# Patient Record
Sex: MALE | Race: WHITE | URBAN - METROPOLITAN AREA
[De-identification: names, ages, dates, MRNs, and addresses within clinical notes are randomized per-mention and may not be internally consistent; named-entity substitution may affect disease eponyms.]

---

## 2020-04-24 ENCOUNTER — EMERGENCY (EMERGENCY)
Facility: HOSPITAL | Age: 60
LOS: 0 days | Discharge: HOME | End: 2020-04-24
Payer: MEDICARE

## 2020-04-24 VITALS
SYSTOLIC BLOOD PRESSURE: 119 MMHG | OXYGEN SATURATION: 99 % | DIASTOLIC BLOOD PRESSURE: 77 MMHG | RESPIRATION RATE: 18 BRPM | HEART RATE: 89 BPM | TEMPERATURE: 98 F

## 2020-04-24 DIAGNOSIS — R05 COUGH: ICD-10-CM

## 2020-04-24 DIAGNOSIS — R07.89 OTHER CHEST PAIN: ICD-10-CM

## 2020-04-24 DIAGNOSIS — R07.0 PAIN IN THROAT: ICD-10-CM

## 2020-04-24 DIAGNOSIS — U07.1 COVID-19: ICD-10-CM

## 2020-04-24 PROCEDURE — 99283 EMERGENCY DEPT VISIT LOW MDM: CPT

## 2020-04-24 PROCEDURE — 87635 SARS-COV-2 COVID-19 AMP PRB: CPT

## 2020-04-24 NOTE — ED STATDOCS - PATIENT PORTAL LINK FT
You can access the FollowMyHealth Patient Portal offered by [facility] by registering at the following website: [email or web address]. By joining [facility]’s FollowMyHealth portal, you will also be able to view your health information using other applications (apps) compatible with our system.

## 2020-04-24 NOTE — ED STATDOCS - PHYSICAL EXAMINATION
Constitutional: NAD AAOx3. Nontoxic, well appearing. Speaking full sentences  w/o distress  Eyes: EOMI, pupils equal  Head: Normocephalic atraumatic  Mouth: no airway obstruction  Cardiac: p8m2ile   Resp: Lungs CTAB  GI: Abd s/nt/nd  Neuro: motor and sensory intact

## 2020-04-24 NOTE — ED STATDOCS - OBJECTIVE STATEMENT
Pt presents to ED with sore throat, chest tightness when breathing in, cough   FOR 5   days.   Pt concerned for possible COVID-19.   Pt here for testing.

## 2020-04-24 NOTE — ED STATDOCS - NS ED ROS FT
ROS:   Constitutional- -fever, -chills.    ENT- -rhinorrhea,  sore throat, no congestion.    Cardiac- no chest pain, no palpitations,   Respiratory- +cough, -SOB    Abdomen- No nausea, no vomiting, no diarrhea.    Urinary- no dysuria, no urgency, no frequency.    Skin- No rashes

## 2020-04-25 LAB — SARS-COV-2 RNA SPEC QL NAA+PROBE: SIGNIFICANT CHANGE UP

## 2020-09-08 ENCOUNTER — APPOINTMENT (OUTPATIENT)
Dept: RADIOLOGY | Facility: CLINIC | Age: 60
End: 2020-09-08
Payer: SELF-PAY

## 2020-09-08 ENCOUNTER — OUTPATIENT (OUTPATIENT)
Dept: OUTPATIENT SERVICES | Facility: HOSPITAL | Age: 60
LOS: 1 days | End: 2020-09-08
Payer: COMMERCIAL

## 2020-09-08 ENCOUNTER — OUTPATIENT (OUTPATIENT)
Dept: OUTPATIENT SERVICES | Facility: HOSPITAL | Age: 60
LOS: 1 days | End: 2020-09-08
Payer: SELF-PAY

## 2020-09-08 VITALS
SYSTOLIC BLOOD PRESSURE: 138 MMHG | TEMPERATURE: 98 F | WEIGHT: 192.02 LBS | HEIGHT: 71 IN | OXYGEN SATURATION: 99 % | DIASTOLIC BLOOD PRESSURE: 90 MMHG | HEART RATE: 68 BPM | RESPIRATION RATE: 15 BRPM

## 2020-09-08 DIAGNOSIS — M25.569 PAIN IN UNSPECIFIED KNEE: ICD-10-CM

## 2020-09-08 DIAGNOSIS — Z98.890 OTHER SPECIFIED POSTPROCEDURAL STATES: Chronic | ICD-10-CM

## 2020-09-08 DIAGNOSIS — M20.42 OTHER HAMMER TOE(S) (ACQUIRED), LEFT FOOT: ICD-10-CM

## 2020-09-08 DIAGNOSIS — S83.232A COMPLEX TEAR OF MEDIAL MENISCUS, CURRENT INJURY, LEFT KNEE, INITIAL ENCOUNTER: ICD-10-CM

## 2020-09-08 DIAGNOSIS — Z01.818 ENCOUNTER FOR OTHER PREPROCEDURAL EXAMINATION: ICD-10-CM

## 2020-09-08 LAB
A1C WITH ESTIMATED AVERAGE GLUCOSE RESULT: 7.5 % — HIGH (ref 4–5.6)
ALBUMIN SERPL ELPH-MCNC: 3.8 G/DL — SIGNIFICANT CHANGE UP (ref 3.3–5)
ALP SERPL-CCNC: 53 U/L — SIGNIFICANT CHANGE UP (ref 40–120)
ALT FLD-CCNC: 43 U/L — SIGNIFICANT CHANGE UP (ref 12–78)
ANION GAP SERPL CALC-SCNC: 5 MMOL/L — SIGNIFICANT CHANGE UP (ref 5–17)
AST SERPL-CCNC: 20 U/L — SIGNIFICANT CHANGE UP (ref 15–37)
BILIRUB SERPL-MCNC: 0.4 MG/DL — SIGNIFICANT CHANGE UP (ref 0.2–1.2)
BUN SERPL-MCNC: 17 MG/DL — SIGNIFICANT CHANGE UP (ref 7–23)
CALCIUM SERPL-MCNC: 9.4 MG/DL — SIGNIFICANT CHANGE UP (ref 8.5–10.1)
CHLORIDE SERPL-SCNC: 105 MMOL/L — SIGNIFICANT CHANGE UP (ref 96–108)
CO2 SERPL-SCNC: 27 MMOL/L — SIGNIFICANT CHANGE UP (ref 22–31)
CREAT SERPL-MCNC: 0.8 MG/DL — SIGNIFICANT CHANGE UP (ref 0.5–1.3)
ESTIMATED AVERAGE GLUCOSE: 169 MG/DL — HIGH (ref 68–114)
GLUCOSE SERPL-MCNC: 154 MG/DL — HIGH (ref 70–99)
HCT VFR BLD CALC: 43.2 % — SIGNIFICANT CHANGE UP (ref 39–50)
HGB BLD-MCNC: 14.8 G/DL — SIGNIFICANT CHANGE UP (ref 13–17)
MCHC RBC-ENTMCNC: 29.9 PG — SIGNIFICANT CHANGE UP (ref 27–34)
MCHC RBC-ENTMCNC: 34.3 GM/DL — SIGNIFICANT CHANGE UP (ref 32–36)
MCV RBC AUTO: 87.3 FL — SIGNIFICANT CHANGE UP (ref 80–100)
NRBC # BLD: 0 /100 WBCS — SIGNIFICANT CHANGE UP (ref 0–0)
PLATELET # BLD AUTO: 311 K/UL — SIGNIFICANT CHANGE UP (ref 150–400)
POTASSIUM SERPL-MCNC: 4 MMOL/L — SIGNIFICANT CHANGE UP (ref 3.5–5.3)
POTASSIUM SERPL-SCNC: 4 MMOL/L — SIGNIFICANT CHANGE UP (ref 3.5–5.3)
PROT SERPL-MCNC: 7.7 G/DL — SIGNIFICANT CHANGE UP (ref 6–8.3)
RBC # BLD: 4.95 M/UL — SIGNIFICANT CHANGE UP (ref 4.2–5.8)
RBC # FLD: 12.4 % — SIGNIFICANT CHANGE UP (ref 10.3–14.5)
SODIUM SERPL-SCNC: 137 MMOL/L — SIGNIFICANT CHANGE UP (ref 135–145)
WBC # BLD: 6.33 K/UL — SIGNIFICANT CHANGE UP (ref 3.8–10.5)
WBC # FLD AUTO: 6.33 K/UL — SIGNIFICANT CHANGE UP (ref 3.8–10.5)

## 2020-09-08 PROCEDURE — 85027 COMPLETE CBC AUTOMATED: CPT

## 2020-09-08 PROCEDURE — 93010 ELECTROCARDIOGRAM REPORT: CPT

## 2020-09-08 PROCEDURE — 73620 X-RAY EXAM OF FOOT: CPT

## 2020-09-08 PROCEDURE — 73620 X-RAY EXAM OF FOOT: CPT | Mod: 26,50

## 2020-09-08 PROCEDURE — 36415 COLL VENOUS BLD VENIPUNCTURE: CPT

## 2020-09-08 PROCEDURE — 83036 HEMOGLOBIN GLYCOSYLATED A1C: CPT

## 2020-09-08 PROCEDURE — 80053 COMPREHEN METABOLIC PANEL: CPT

## 2020-09-08 PROCEDURE — 93005 ELECTROCARDIOGRAM TRACING: CPT

## 2020-09-08 PROCEDURE — G0463: CPT

## 2020-09-08 NOTE — H&P PST ADULT - HISTORY OF PRESENT ILLNESS
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

## 2020-09-08 NOTE — H&P PST ADULT - NSANTHOSAYNRD_GEN_A_CORE
No. SALIMA screening performed.  STOP BANG Legend: 0-2 = LOW Risk; 3-4 = INTERMEDIATE Risk; 5-8 = HIGH Risk

## 2020-09-08 NOTE — H&P PST ADULT - ASSESSMENT
59 yo M for left knee arthroscopy  meniscectomy     Medical clearance pending w labs        COVID swab pending     Advised no diab meds DOS   no metformin x 24 hrs 61 yo M for left knee arthroscopy  meniscectomy     Medical clearance pending w labs        COVID swab pending     Advised no diab meds DOS   no metformin x 24 hrs  FS DOS

## 2020-09-10 PROBLEM — M19.90 UNSPECIFIED OSTEOARTHRITIS, UNSPECIFIED SITE: Chronic | Status: ACTIVE | Noted: 2020-09-08

## 2020-09-10 PROBLEM — E11.9 TYPE 2 DIABETES MELLITUS WITHOUT COMPLICATIONS: Chronic | Status: ACTIVE | Noted: 2020-09-08

## 2020-09-10 PROBLEM — M54.9 DORSALGIA, UNSPECIFIED: Chronic | Status: ACTIVE | Noted: 2020-09-08

## 2020-09-13 ENCOUNTER — OUTPATIENT (OUTPATIENT)
Dept: OUTPATIENT SERVICES | Facility: HOSPITAL | Age: 60
LOS: 1 days | End: 2020-09-13
Payer: COMMERCIAL

## 2020-09-13 DIAGNOSIS — Z98.890 OTHER SPECIFIED POSTPROCEDURAL STATES: Chronic | ICD-10-CM

## 2020-09-13 DIAGNOSIS — Z11.59 ENCOUNTER FOR SCREENING FOR OTHER VIRAL DISEASES: ICD-10-CM

## 2020-09-13 LAB — SARS-COV-2 RNA SPEC QL NAA+PROBE: SIGNIFICANT CHANGE UP

## 2020-09-13 PROCEDURE — U0003: CPT

## 2020-09-14 ENCOUNTER — TRANSCRIPTION ENCOUNTER (OUTPATIENT)
Age: 60
End: 2020-09-14

## 2020-09-15 ENCOUNTER — OUTPATIENT (OUTPATIENT)
Dept: OUTPATIENT SERVICES | Facility: HOSPITAL | Age: 60
LOS: 1 days | End: 2020-09-15
Payer: COMMERCIAL

## 2020-09-15 ENCOUNTER — RESULT REVIEW (OUTPATIENT)
Age: 60
End: 2020-09-15

## 2020-09-15 VITALS
DIASTOLIC BLOOD PRESSURE: 85 MMHG | OXYGEN SATURATION: 95 % | HEIGHT: 71 IN | RESPIRATION RATE: 15 BRPM | TEMPERATURE: 98 F | WEIGHT: 192.02 LBS | HEART RATE: 80 BPM | SYSTOLIC BLOOD PRESSURE: 113 MMHG

## 2020-09-15 VITALS
RESPIRATION RATE: 18 BRPM | HEART RATE: 75 BPM | TEMPERATURE: 98 F | DIASTOLIC BLOOD PRESSURE: 76 MMHG | SYSTOLIC BLOOD PRESSURE: 127 MMHG | OXYGEN SATURATION: 98 %

## 2020-09-15 DIAGNOSIS — Z98.890 OTHER SPECIFIED POSTPROCEDURAL STATES: Chronic | ICD-10-CM

## 2020-09-15 DIAGNOSIS — S83.232A COMPLEX TEAR OF MEDIAL MENISCUS, CURRENT INJURY, LEFT KNEE, INITIAL ENCOUNTER: ICD-10-CM

## 2020-09-15 PROCEDURE — 88304 TISSUE EXAM BY PATHOLOGIST: CPT

## 2020-09-15 PROCEDURE — 82962 GLUCOSE BLOOD TEST: CPT

## 2020-09-15 PROCEDURE — 88304 TISSUE EXAM BY PATHOLOGIST: CPT | Mod: 26

## 2020-09-15 PROCEDURE — 29880 ARTHRS KNE SRG MNISECTMY M&L: CPT | Mod: LT

## 2020-09-15 RX ORDER — SODIUM CHLORIDE 9 MG/ML
1000 INJECTION, SOLUTION INTRAVENOUS
Refills: 0 | Status: DISCONTINUED | OUTPATIENT
Start: 2020-09-15 | End: 2020-09-15

## 2020-09-15 RX ORDER — HYDROMORPHONE HYDROCHLORIDE 2 MG/ML
0.5 INJECTION INTRAMUSCULAR; INTRAVENOUS; SUBCUTANEOUS
Refills: 0 | Status: DISCONTINUED | OUTPATIENT
Start: 2020-09-15 | End: 2020-09-15

## 2020-09-15 RX ORDER — MORPHINE SULFATE 50 MG/1
8 CAPSULE, EXTENDED RELEASE ORAL ONCE
Refills: 0 | Status: DISCONTINUED | OUTPATIENT
Start: 2020-09-15 | End: 2020-09-15

## 2020-09-15 RX ORDER — CEFAZOLIN SODIUM 1 G
2000 VIAL (EA) INJECTION ONCE
Refills: 0 | Status: COMPLETED | OUTPATIENT
Start: 2020-09-15 | End: 2020-09-15

## 2020-09-15 RX ORDER — ACETAMINOPHEN 500 MG
1000 TABLET ORAL ONCE
Refills: 0 | Status: DISCONTINUED | OUTPATIENT
Start: 2020-09-15 | End: 2020-09-15

## 2020-09-15 RX ORDER — OXYCODONE AND ACETAMINOPHEN 5; 325 MG/1; MG/1
1 TABLET ORAL
Qty: 20 | Refills: 0
Start: 2020-09-15 | End: 2020-09-21

## 2020-09-15 RX ADMIN — SODIUM CHLORIDE 50 MILLILITER(S): 9 INJECTION, SOLUTION INTRAVENOUS at 13:26

## 2020-09-15 RX ADMIN — SODIUM CHLORIDE 75 MILLILITER(S): 9 INJECTION, SOLUTION INTRAVENOUS at 16:06

## 2020-09-15 NOTE — BRIEF OPERATIVE NOTE - NSICDXBRIEFPROCEDURE_GEN_ALL_CORE_FT
PROCEDURES:  Arthroscopic meniscectomy of left knee 15-Sep-2020 16:44:40  Joann Nguyen  Left knee arthroscopy 15-Sep-2020 16:39:55  Joann Nguyen

## 2020-09-15 NOTE — ASU DISCHARGE PLAN (ADULT/PEDIATRIC) - CARE PROVIDER_API CALL
Matthew Chi  ORTHOPAEDIC SURGERY  [address] [place] [address]  Phone: [phone]  Fax: [phone]  Follow Up Time:

## 2020-09-15 NOTE — ASU DISCHARGE PLAN (ADULT/PEDIATRIC) - CALL YOUR DOCTOR IF YOU HAVE ANY OF THE FOLLOWING:
Bleeding that does not stop/Wound/Surgical Site with redness, or foul smelling discharge or pus/Pain not relieved by Medications/Swelling that gets worse

## 2021-02-22 ENCOUNTER — APPOINTMENT (OUTPATIENT)
Dept: VASCULAR SURGERY | Facility: CLINIC | Age: 61
End: 2021-02-22
Payer: COMMERCIAL

## 2021-02-22 VITALS
HEIGHT: 72 IN | DIASTOLIC BLOOD PRESSURE: 92 MMHG | WEIGHT: 205 LBS | HEART RATE: 80 BPM | TEMPERATURE: 97.4 F | OXYGEN SATURATION: 97 % | SYSTOLIC BLOOD PRESSURE: 157 MMHG | BODY MASS INDEX: 27.77 KG/M2

## 2021-02-22 DIAGNOSIS — I87.2 VENOUS INSUFFICIENCY (CHRONIC) (PERIPHERAL): ICD-10-CM

## 2021-02-22 PROCEDURE — 93970 EXTREMITY STUDY: CPT

## 2021-02-22 PROCEDURE — 99203 OFFICE O/P NEW LOW 30 MIN: CPT

## 2021-02-22 PROCEDURE — 99072 ADDL SUPL MATRL&STAF TM PHE: CPT

## 2021-02-22 NOTE — PHYSICAL EXAM
[JVD] : no jugular venous distention  [Normal Breath Sounds] : Normal breath sounds [Normal Heart Sounds] : normal heart sounds [2+] : left 2+ [Ankle Swelling (On Exam)] : present [Ankle Swelling Bilaterally] : bilaterally  [Varicose Veins Of Lower Extremities] : bilaterally [Ankle Swelling On The Left] : moderate [] : of the left leg [Ankle Swelling On The Right] : mild [Abdomen Masses] : No abdominal masses [Abdomen Tenderness] : ~T ~M No abdominal tenderness [No Rash or Lesion] : No rash or lesion [Alert] : alert [Oriented to Person] : oriented to person [Oriented to Place] : oriented to place [Oriented to Time] : oriented to time [Calm] : calm [de-identified] : Well appearing

## 2021-02-22 NOTE — HISTORY OF PRESENT ILLNESS
[FreeTextEntry1] : 60 year old man presents for evaluation of bilateral lower extremity varicose veins. Symptoms have worsened over the last year. He complains of dull aching with standing or sitting, heaviness in his legs. He also has ankle swelling that worsens through the day. Symptoms are worse in his left leg.\par No history of DVT.\par he denies claudication, rest pain or leg ulcers\par \par \par

## 2021-02-22 NOTE — ASSESSMENT
[FreeTextEntry1] : 60 year old man with bilateral lower extremity symptomatic varicose veins\par Venous duplex reveals pathologic reflux> 5 seconds in his left great saphenous vein. He has few incompetent superficial veins in his right leg.\par He has failed conservative management strategies including compression stockings and exercise.\par I have discussed the option of GSV RF ablation. We have discussed the risks and benefits of the planned procedure and he will like to proceed.\par Will schedule for left GSV ablation

## 2021-05-19 ENCOUNTER — RESULT CHARGE (OUTPATIENT)
Age: 61
End: 2021-05-19

## 2021-05-19 ENCOUNTER — LABORATORY RESULT (OUTPATIENT)
Age: 61
End: 2021-05-19

## 2021-05-19 ENCOUNTER — APPOINTMENT (OUTPATIENT)
Dept: ENDOCRINOLOGY | Facility: CLINIC | Age: 61
End: 2021-05-19
Payer: COMMERCIAL

## 2021-05-19 VITALS
SYSTOLIC BLOOD PRESSURE: 130 MMHG | BODY MASS INDEX: 27.09 KG/M2 | HEART RATE: 84 BPM | HEIGHT: 72 IN | WEIGHT: 200 LBS | DIASTOLIC BLOOD PRESSURE: 87 MMHG | OXYGEN SATURATION: 98 %

## 2021-05-19 DIAGNOSIS — E11.43 TYPE 2 DIABETES MELLITUS WITH DIABETIC AUTONOMIC (POLY)NEUROPATHY: ICD-10-CM

## 2021-05-19 DIAGNOSIS — E66.3 OVERWEIGHT: ICD-10-CM

## 2021-05-19 PROCEDURE — 36415 COLL VENOUS BLD VENIPUNCTURE: CPT

## 2021-05-19 PROCEDURE — 83036 HEMOGLOBIN GLYCOSYLATED A1C: CPT | Mod: QW

## 2021-05-19 PROCEDURE — 99072 ADDL SUPL MATRL&STAF TM PHE: CPT

## 2021-05-19 PROCEDURE — 99205 OFFICE O/P NEW HI 60 MIN: CPT | Mod: 25

## 2021-05-19 PROCEDURE — 82962 GLUCOSE BLOOD TEST: CPT | Mod: NC

## 2021-05-19 RX ORDER — BLOOD-GLUCOSE METER
W/DEVICE EACH MISCELLANEOUS
Qty: 1 | Refills: 0 | Status: ACTIVE | COMMUNITY
Start: 2021-05-19 | End: 1900-01-01

## 2021-05-19 RX ORDER — LANCETS 33 GAUGE
EACH MISCELLANEOUS
Qty: 3 | Refills: 3 | Status: ACTIVE | COMMUNITY
Start: 2021-05-19 | End: 1900-01-01

## 2021-05-24 ENCOUNTER — NON-APPOINTMENT (OUTPATIENT)
Age: 61
End: 2021-05-24

## 2021-05-24 DIAGNOSIS — B35.1 TINEA UNGUIUM: ICD-10-CM

## 2021-05-24 DIAGNOSIS — Z72.89 OTHER PROBLEMS RELATED TO LIFESTYLE: ICD-10-CM

## 2021-05-24 DIAGNOSIS — Z78.9 OTHER SPECIFIED HEALTH STATUS: ICD-10-CM

## 2021-05-24 DIAGNOSIS — Z83.3 FAMILY HISTORY OF DIABETES MELLITUS: ICD-10-CM

## 2021-05-24 DIAGNOSIS — Z86.79 PERSONAL HISTORY OF OTHER DISEASES OF THE CIRCULATORY SYSTEM: ICD-10-CM

## 2021-05-24 RX ORDER — BLOOD SUGAR DIAGNOSTIC
STRIP MISCELLANEOUS
Qty: 3 | Refills: 5 | Status: ACTIVE | COMMUNITY
Start: 2021-05-19 | End: 1900-01-01

## 2021-05-24 RX ORDER — CAPSAICIN 0.1 G/100G
0.1 CREAM TOPICAL
Qty: 3 | Refills: 3 | Status: ACTIVE | COMMUNITY
Start: 2021-05-19 | End: 1900-01-01

## 2021-05-26 ENCOUNTER — APPOINTMENT (OUTPATIENT)
Dept: FAMILY MEDICINE | Facility: CLINIC | Age: 61
End: 2021-05-26

## 2021-05-27 ENCOUNTER — LABORATORY RESULT (OUTPATIENT)
Age: 61
End: 2021-05-27

## 2021-06-02 LAB
25(OH)D3 SERPL-MCNC: 38.3 NG/ML
ALBUMIN SERPL ELPH-MCNC: 4.8 G/DL
ALP BLD-CCNC: 59 U/L
ALT SERPL-CCNC: 106 U/L
ANION GAP SERPL CALC-SCNC: 14 MMOL/L
AST SERPL-CCNC: 46 U/L
BILIRUB SERPL-MCNC: 0.6 MG/DL
BUN SERPL-MCNC: 22 MG/DL
C PEPTIDE SERPL-MCNC: 4 NG/ML
CALCIUM SERPL-MCNC: 9.6 MG/DL
CHLORIDE SERPL-SCNC: 101 MMOL/L
CHOLEST SERPL-MCNC: 172 MG/DL
CO2 SERPL-SCNC: 21 MMOL/L
CREAT SERPL-MCNC: 0.81 MG/DL
CREAT SPEC-SCNC: 212 MG/DL
FRUCTOSAMINE SERPL-MCNC: 360 UMOL/L
GLUCOSE SERPL-MCNC: 194 MG/DL
HDLC SERPL-MCNC: 45 MG/DL
LDLC SERPL CALC-MCNC: 98 MG/DL
MICROALBUMIN 24H UR DL<=1MG/L-MCNC: 2.5 MG/DL
MICROALBUMIN/CREAT 24H UR-RTO: 12 MG/G
NONHDLC SERPL-MCNC: 127 MG/DL
POTASSIUM SERPL-SCNC: 4.5 MMOL/L
PROT SERPL-MCNC: 7.7 G/DL
SODIUM SERPL-SCNC: 136 MMOL/L
TRIGL SERPL-MCNC: 142 MG/DL
VIT B12 SERPL-MCNC: 1234 PG/ML

## 2021-06-03 ENCOUNTER — NON-APPOINTMENT (OUTPATIENT)
Age: 61
End: 2021-06-03

## 2021-06-09 ENCOUNTER — NON-APPOINTMENT (OUTPATIENT)
Age: 61
End: 2021-06-09

## 2021-06-10 ENCOUNTER — APPOINTMENT (OUTPATIENT)
Dept: FAMILY MEDICINE | Facility: CLINIC | Age: 61
End: 2021-06-10
Payer: COMMERCIAL

## 2021-06-10 ENCOUNTER — NON-APPOINTMENT (OUTPATIENT)
Age: 61
End: 2021-06-10

## 2021-06-10 VITALS
BODY MASS INDEX: 27.77 KG/M2 | HEIGHT: 72 IN | DIASTOLIC BLOOD PRESSURE: 92 MMHG | WEIGHT: 205 LBS | HEART RATE: 78 BPM | TEMPERATURE: 96.7 F | OXYGEN SATURATION: 98 % | SYSTOLIC BLOOD PRESSURE: 138 MMHG

## 2021-06-10 VITALS
DIASTOLIC BLOOD PRESSURE: 92 MMHG | HEIGHT: 72 IN | BODY MASS INDEX: 27.77 KG/M2 | WEIGHT: 205 LBS | TEMPERATURE: 96.7 F | HEART RATE: 78 BPM | OXYGEN SATURATION: 98 % | SYSTOLIC BLOOD PRESSURE: 132 MMHG

## 2021-06-10 DIAGNOSIS — G47.00 INSOMNIA, UNSPECIFIED: ICD-10-CM

## 2021-06-10 DIAGNOSIS — G62.9 POLYNEUROPATHY, UNSPECIFIED: ICD-10-CM

## 2021-06-10 DIAGNOSIS — M25.512 PAIN IN LEFT SHOULDER: ICD-10-CM

## 2021-06-10 PROCEDURE — 99214 OFFICE O/P EST MOD 30 MIN: CPT

## 2021-06-10 PROCEDURE — 99072 ADDL SUPL MATRL&STAF TM PHE: CPT

## 2021-06-10 NOTE — REVIEW OF SYSTEMS
[Negative] : Heme/Lymph [Joint Pain] : joint pain [Joint Stiffness] : joint stiffness [Muscle Pain] : muscle pain [Back Pain] : back pain [de-identified] : numbness and tingling of right 4th and 5th digits

## 2021-06-10 NOTE — PLAN
[FreeTextEntry1] : \par I have advised followup with his Ortho re ongoing shoulder and knee pains\par I recommend that patient change sleeping position ,and use salonpas , voltaren gel, Arthritis Tylenol to aid in pain relief\par Trazodone prescribed at pts request today

## 2021-06-10 NOTE — HISTORY OF PRESENT ILLNESS
[FreeTextEntry8] : . \par He has had  shoulder and knee pain and currently under the care of an Orthopedist ,he was recommended to undergo left shoulder surgery.He states that due to his pain ,he has difficulty sleeping and recently has been favoring his right shoulder , and since that time he has intermittent tingling of his 4th and 5th digits during the night \par He is requesting something for sleep ,he used trazodone in past and is requesting refill for this today

## 2021-07-09 ENCOUNTER — APPOINTMENT (OUTPATIENT)
Dept: FAMILY MEDICINE | Facility: CLINIC | Age: 61
End: 2021-07-09

## 2021-07-09 DIAGNOSIS — R79.89 OTHER SPECIFIED ABNORMAL FINDINGS OF BLOOD CHEMISTRY: ICD-10-CM

## 2021-07-09 DIAGNOSIS — E78.5 HYPERLIPIDEMIA, UNSPECIFIED: ICD-10-CM

## 2021-07-23 ENCOUNTER — APPOINTMENT (OUTPATIENT)
Dept: FAMILY MEDICINE | Facility: CLINIC | Age: 61
End: 2021-07-23
Payer: COMMERCIAL

## 2021-07-23 VITALS
OXYGEN SATURATION: 97 % | HEIGHT: 72 IN | SYSTOLIC BLOOD PRESSURE: 128 MMHG | HEART RATE: 85 BPM | WEIGHT: 200 LBS | TEMPERATURE: 97 F | BODY MASS INDEX: 27.09 KG/M2 | DIASTOLIC BLOOD PRESSURE: 78 MMHG

## 2021-07-23 DIAGNOSIS — D17.9 BENIGN LIPOMATOUS NEOPLASM, UNSPECIFIED: ICD-10-CM

## 2021-07-23 PROCEDURE — 99072 ADDL SUPL MATRL&STAF TM PHE: CPT

## 2021-07-23 PROCEDURE — 99213 OFFICE O/P EST LOW 20 MIN: CPT

## 2021-07-23 NOTE — PLAN
[FreeTextEntry1] : I have advised patient to consult with General Surgeon for evaluation and possible excision \par Referral list provided

## 2021-07-23 NOTE — PHYSICAL EXAM
[No Acute Distress] : no acute distress [Well Nourished] : well nourished [Well Developed] : well developed [Well-Appearing] : well-appearing [Normal Sclera/Conjunctiva] : normal sclera/conjunctiva [PERRL] : pupils equal round and reactive to light [EOMI] : extraocular movements intact [Normal Outer Ear/Nose] : the outer ears and nose were normal in appearance [Normal Oropharynx] : the oropharynx was normal [No JVD] : no jugular venous distention [No Lymphadenopathy] : no lymphadenopathy [Supple] : supple [Thyroid Normal, No Nodules] : the thyroid was normal and there were no nodules present [No Respiratory Distress] : no respiratory distress  [No Accessory Muscle Use] : no accessory muscle use [Clear to Auscultation] : lungs were clear to auscultation bilaterally [Normal Rate] : normal rate  [Regular Rhythm] : with a regular rhythm [Normal S1, S2] : normal S1 and S2 [No Murmur] : no murmur heard [No Carotid Bruits] : no carotid bruits [No Abdominal Bruit] : a ~M bruit was not heard ~T in the abdomen [No Varicosities] : no varicosities [Pedal Pulses Present] : the pedal pulses are present [No Edema] : there was no peripheral edema [No Palpable Aorta] : no palpable aorta [No Extremity Clubbing/Cyanosis] : no extremity clubbing/cyanosis [Soft] : abdomen soft [Non Tender] : non-tender [Non-distended] : non-distended [No Masses] : no abdominal mass palpated [No HSM] : no HSM [Normal Bowel Sounds] : normal bowel sounds [Normal Posterior Cervical Nodes] : no posterior cervical lymphadenopathy [Normal Anterior Cervical Nodes] : no anterior cervical lymphadenopathy [No CVA Tenderness] : no CVA  tenderness [No Spinal Tenderness] : no spinal tenderness [No Joint Swelling] : no joint swelling [Grossly Normal Strength/Tone] : grossly normal strength/tone [No Rash] : no rash [Coordination Grossly Intact] : coordination grossly intact [No Focal Deficits] : no focal deficits [Normal Gait] : normal gait [Deep Tendon Reflexes (DTR)] : deep tendon reflexes were 2+ and symmetric [Normal Affect] : the affect was normal [Normal Insight/Judgement] : insight and judgment were intact [de-identified] : several lipomas of upper back

## 2021-07-23 NOTE — HISTORY OF PRESENT ILLNESS
[FreeTextEntry8] : Patient is complaining of nodules on back x many years. He has never had it evaluated and denies any discomfort\par He states the nodules are stable and not enlarging

## 2021-07-26 ENCOUNTER — APPOINTMENT (OUTPATIENT)
Dept: UROLOGY | Facility: CLINIC | Age: 61
End: 2021-07-26
Payer: COMMERCIAL

## 2021-07-26 VITALS
RESPIRATION RATE: 16 BRPM | DIASTOLIC BLOOD PRESSURE: 89 MMHG | HEIGHT: 72 IN | BODY MASS INDEX: 27.63 KG/M2 | HEART RATE: 54 BPM | TEMPERATURE: 98 F | SYSTOLIC BLOOD PRESSURE: 134 MMHG | WEIGHT: 204 LBS

## 2021-07-26 DIAGNOSIS — N52.9 MALE ERECTILE DYSFUNCTION, UNSPECIFIED: ICD-10-CM

## 2021-07-26 PROCEDURE — 99204 OFFICE O/P NEW MOD 45 MIN: CPT

## 2021-07-26 PROCEDURE — 99072 ADDL SUPL MATRL&STAF TM PHE: CPT

## 2021-07-26 RX ORDER — SILDENAFIL 50 MG/1
50 TABLET ORAL
Qty: 30 | Refills: 3 | Status: ACTIVE | COMMUNITY
Start: 2021-07-26 | End: 1900-01-01

## 2021-07-26 RX ORDER — SILDENAFIL 50 MG/1
50 TABLET ORAL
Qty: 5 | Refills: 0 | Status: DISCONTINUED | COMMUNITY
Start: 2021-06-10 | End: 2021-07-26

## 2021-07-26 RX ORDER — TADALAFIL 5 MG/1
5 TABLET ORAL
Qty: 10 | Refills: 0 | Status: DISCONTINUED | COMMUNITY
Start: 2021-05-19 | End: 2021-07-26

## 2021-07-26 NOTE — PHYSICAL EXAM
[General Appearance - Well Developed] : well developed [General Appearance - Well Nourished] : well nourished [Normal Appearance] : normal appearance [Well Groomed] : well groomed [General Appearance - In No Acute Distress] : no acute distress [Edema] : no peripheral edema [Respiration, Rhythm And Depth] : normal respiratory rhythm and effort [Exaggerated Use Of Accessory Muscles For Inspiration] : no accessory muscle use [Abdomen Soft] : soft [Abdomen Tenderness] : non-tender [Costovertebral Angle Tenderness] : no ~M costovertebral angle tenderness [Urethral Meatus] : meatus normal [Urinary Bladder Findings] : the bladder was normal on palpation [Scrotum] : the scrotum was normal [Rectal Exam - Seminal Vesicles] : the seminal vesicles were normal [Epididymis] : the epididymides were normal [Testes Tenderness] : no tenderness of the testes [Testes Mass (___cm)] : there were no testicular masses [Normal Station and Gait] : the gait and station were normal for the patient's age [] : no rash [Oriented To Time, Place, And Person] : oriented to person, place, and time [Affect] : the affect was normal [Mood] : the mood was normal [Not Anxious] : not anxious [Inguinal Lymph Nodes Enlarged Bilaterally] : inguinal

## 2021-07-26 NOTE — HISTORY OF PRESENT ILLNESS
[FreeTextEntry1] : 59 yo male for initial consultation regarding erectile dysfunction\par PMH: DM, arthritis\par PSH: knee arthroscopy\par never a smoker\par NKDA\par \par no previous urologic history\par has been complaining of ED for 4 years\par does not have erections at all, no morning erections also, , libido is intact\par \par was prescribed some cialis, was not instructed on how to use it.\par tried it twice with no improvement\par \par - testosterone normal\par - HBA1C elevated to 8 (05/2021)\par - TSH normal\par - liver enzymes slightly elevated with \par \par \par explained the importance of diabetes control\par needs to consult with his PCP regarding his liver enzymes, and if he needs further testing\par also recommended consulting with cardiology regarding any need for testing due to his ED, that can potentially be an early manifestation of small blood vessels disease\par explained the mechanism of action of pde5 inhibitors, timing, and the need for sexual stimulus. also explained the need to persist and not get discouraged early\par the patient still has libido and sensation. discussed alternative treatment options if oral medication fail.\par \par plan:\par - start sildenafil treatment, discussed instructions again, can take up to 100 mg\par - consult with PCP and cardiology\par - next visit in 1 month, if not improvement will consider consulting a specialist\par - will follow up on PSA with primary care (does not remember result, but know it was tested) [Erectile Dysfunction] : Erectile Dysfunction [None] : None

## 2021-07-29 ENCOUNTER — RX RENEWAL (OUTPATIENT)
Age: 61
End: 2021-07-29

## 2021-07-29 RX ORDER — EMPAGLIFLOZIN 10 MG/1
10 TABLET, FILM COATED ORAL DAILY
Qty: 90 | Refills: 0 | Status: ACTIVE | COMMUNITY
Start: 2021-05-19 | End: 1900-01-01

## 2021-08-16 ENCOUNTER — APPOINTMENT (OUTPATIENT)
Dept: ENDOCRINOLOGY | Facility: CLINIC | Age: 61
End: 2021-08-16

## 2021-08-17 ENCOUNTER — RESULT REVIEW (OUTPATIENT)
Age: 61
End: 2021-08-17

## 2021-11-22 ENCOUNTER — NON-APPOINTMENT (OUTPATIENT)
Age: 61
End: 2021-11-22

## 2022-01-04 ENCOUNTER — RX RENEWAL (OUTPATIENT)
Age: 62
End: 2022-01-04

## 2022-01-05 ENCOUNTER — EMERGENCY (EMERGENCY)
Facility: HOSPITAL | Age: 62
LOS: 0 days | Discharge: HOME | End: 2022-01-05
Attending: EMERGENCY MEDICINE
Payer: COMMERCIAL

## 2022-01-05 VITALS
TEMPERATURE: 98 F | DIASTOLIC BLOOD PRESSURE: 92 MMHG | SYSTOLIC BLOOD PRESSURE: 112 MMHG | RESPIRATION RATE: 16 BRPM | OXYGEN SATURATION: 98 % | HEART RATE: 88 BPM

## 2022-01-05 VITALS — HEIGHT: 71 IN | WEIGHT: 195.11 LBS

## 2022-01-05 DIAGNOSIS — Z98.890 OTHER SPECIFIED POSTPROCEDURAL STATES: Chronic | ICD-10-CM

## 2022-01-05 DIAGNOSIS — S82.62XA DISPLACED FRACTURE OF LATERAL MALLEOLUS OF LEFT FIBULA, INITIAL ENCOUNTER FOR CLOSED FRACTURE: ICD-10-CM

## 2022-01-05 DIAGNOSIS — S51.012A LACERATION WITHOUT FOREIGN BODY OF LEFT ELBOW, INITIAL ENCOUNTER: ICD-10-CM

## 2022-01-05 DIAGNOSIS — M25.572 PAIN IN LEFT ANKLE AND JOINTS OF LEFT FOOT: ICD-10-CM
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
## 2022-01-05 PROCEDURE — 99284 EMERGENCY DEPT VISIT MOD MDM: CPT | Mod: 25

## 2022-01-05 PROCEDURE — 12001 RPR S/N/AX/GEN/TRNK 2.5CM/<: CPT

## 2022-01-05 PROCEDURE — 73562 X-RAY EXAM OF KNEE 3: CPT | Mod: 26,LT

## 2022-01-05 PROCEDURE — 73562 X-RAY EXAM OF KNEE 3: CPT | Mod: LT

## 2022-01-05 PROCEDURE — 73590 X-RAY EXAM OF LOWER LEG: CPT | Mod: LT

## 2022-01-05 PROCEDURE — 27786 TREATMENT OF ANKLE FRACTURE: CPT | Mod: LT

## 2022-01-05 PROCEDURE — 73610 X-RAY EXAM OF ANKLE: CPT | Mod: LT

## 2022-01-05 PROCEDURE — 73610 X-RAY EXAM OF ANKLE: CPT | Mod: 26,LT,76

## 2022-01-05 PROCEDURE — 12001 RPR S/N/AX/GEN/TRNK 2.5CM/<: CPT | Mod: XU

## 2022-01-05 PROCEDURE — 73590 X-RAY EXAM OF LOWER LEG: CPT | Mod: 26,LT

## 2022-01-05 RX ORDER — ACETAMINOPHEN 500 MG
1000 TABLET ORAL ONCE
Refills: 0 | Status: COMPLETED | OUTPATIENT
Start: 2022-01-05 | End: 2022-01-05

## 2022-01-05 RX ORDER — OXYCODONE HYDROCHLORIDE 5 MG/1
1 TABLET ORAL
Qty: 16 | Refills: 0
Start: 2022-01-05 | End: 2022-01-08

## 2022-01-05 RX ADMIN — Medication 1000 MILLIGRAM(S): at 15:34

## 2022-01-05 NOTE — ED STATDOCS - NS ED ROS FT
Constitutional: No fever or chills  Eyes: No visual changes  HEENT: No throat pain  CV: No chest pain  Resp: No SOB no cough  GI: No abd pain, nausea or vomiting  : No dysuria  MSK: + left ankle pain  Skin: + lac  Neuro: No headache

## 2022-01-05 NOTE — ED STATDOCS - NSFOLLOWUPINSTRUCTIONS_ED_ALL_ED_FT
Ankle Dislocation    WHAT YOU NEED TO KNOW:    An ankle dislocation happens when the bones in your ankle joint move out of place. You may also have an ankle fracture (break in the bone). An ankle dislocation and fracture may need surgery.    Dislocated Ankle         DISCHARGE INSTRUCTIONS:    Seek care immediately if:   •The skin around your ankle begins to feel hot, tight, or is shiny or pale.      •Your cast or splint feels too tight.      •Your ankle, foot, or toes feel numb.      Call your doctor or bone specialist if:   •You have trouble walking, or more swelling, pain, or stiffness in your ankle, even after you take your medicine.      •You have questions or concerns about your condition or care.      Medicines: You may need any of the following:   •Prescription pain medicine may be given. Ask your healthcare provider how to take this medicine safely. Some prescription pain medicines contain acetaminophen. Do not take other medicines that contain acetaminophen without talking to your healthcare provider. Too much acetaminophen may cause liver damage. Prescription pain medicine may cause constipation. Ask your healthcare provider how to prevent or treat constipation.       •Acetaminophen decreases pain and fever. It is available without a doctor's order. Ask how much to take and how often to take it. Follow directions. Read the labels of all other medicines you are using to see if they also contain acetaminophen, or ask your doctor or pharmacist. Acetaminophen can cause liver damage if not taken correctly. Do not use more than 4 grams (4,000 milligrams) total of acetaminophen in one day.       •NSAIDs, such as ibuprofen, help decrease swelling, pain, and fever. This medicine is available with or without a doctor's order. NSAIDs can cause stomach bleeding or kidney problems in certain people. If you take blood thinner medicine, always ask if NSAIDs are safe for you. Always read the medicine label and follow directions. Do not give these medicines to children under 6 months of age without direction from your child's healthcare provider.      •Take your medicine as directed. Contact your healthcare provider if you think your medicine is not helping or if you have side effects. Tell him or her if you are allergic to any medicine. Keep a list of the medicines, vitamins, and herbs you take. Include the amounts, and when and why you take them. Bring the list or the pill bottles to follow-up visits. Carry your medicine list with you in case of an emergency.      Rest your ankle: You will need to rest your ankle for 6 weeks after your injury. Do not put pressure on your ankle for long periods of time. This will help keep your ankle safe from more damage, and help it heal faster. Ask your healthcare provider when you may return to your normal daily activities. Movement and activity are helpful for healing. After 6 weeks, practice walking as directed.    Apply ice to your ankle: Apply ice for 15 to 20 minutes every hour or as directed. Use an ice pack, or put crushed ice in a plastic bag. Cover it with a towel before you apply it to your ankle. Ice helps prevent tissue damage and decreases swelling and pain.    Compress your ankle: You may need to use an elastic bandage to compress (put pressure on) your ankle to help decrease swelling. Compression also helps support your ankle and allows it to heal. Wear your ankle wrap for as long as directed. You may also need a brace, short leg cast, or splint to help protect your ankle. A splint is a type of brace that keeps your ankle stable. Ask how to care for your brace, cast, or splint.    How to Wrap an Elastic Bandage         Elevate your ankle: Elevate your ankle above the level of your heart as often as you can. This will help decrease swelling and pain. Prop your ankle on pillows or blankets to keep it elevated comfortably.    Elevate Leg         Use crutches, if directed: You may need crutches to help you walk while your ankle heals. Crutches help you keep your weight off your ankle, and help prevent more ankle damage.    Walking with Crutches         Go to physical therapy, if directed: A physical therapist can teach you exercises to increase the range of motion in your ankle. Exercises make your ankle stronger, increase balance, and decrease pain. You may be told to continue the exercises after physical therapy ends to help prevent another dislocation.    Follow up with your doctor or bone specialist as directed: Write down your questions so you remember to ask them during your visits.    Sutured Wound Care  ImageSutures are stitches that can be used to close wounds. Taking care of your wound properly can help prevent pain and infection. It can also help your wound to heal more quickly.    How is this treated?  Wound Care     Keep the wound clean and dry.  If you were given a bandage (dressing), change it at least one time per day or as told by your doctor. You should also change it if it gets wet or dirty.  Keep the wound completely dry for the first 24 hours or as told by your doctor. After that time, you may shower or bathe. However, make sure that the wound is not soaked in water until the sutures have been removed.  Clean the wound one time each day or as told by your doctor.    Wash the wound with soap and water.  Rinse the wound with water to remove all soap.  Pat the wound dry with a clean towel. Do not rub the wound.    After cleaning the wound, put a thin layer of antibiotic ointment on it as told your doctor. This ointment:    Helps to prevent infection.  Keeps the bandage from sticking to the wound.    Have the sutures removed as told by your doctor.  General Instructions     Take or apply medicines only as told by your doctor.  To help prevent scarring, make sure to cover your wound with sunscreen whenever you are outside after the sutures are removed and the wound is healed. Make sure to wear a sunscreen of at least 30 SPF.  If you were prescribed an antibiotic medicine or ointment, finish all of it even if you start to feel better.  Do not scratch or pick at the wound.  Keep all follow-up visits as told by your doctor. This is important.  Check your wound every day for signs of infection. Watch for:    Redness, swelling, or pain.  Fluid, blood, or pus.    Raise (elevate) the injured area above the level of your heart while you are sitting or lying down, if possible.  Avoid stretching your wound.  Drink enough fluids to keep your pee (urine) clear or pale yellow.  Contact a doctor if:  You were given a tetanus shot and you have any of these where the needle went in:    Swelling.  Very bad pain.  Redness.  Bleeding.    You have a fever.  A wound that was closed breaks open.  You notice a bad smell coming from the wound.  You notice something coming out of the wound, such as wood or glass.  Medicine does not help your pain.  You have any of these at the site of the wound.    More redness.  More swelling.  More pain.    You have any of these coming from the wound.    Fluid.  Blood.  Pus.    You notice a change in the color of your skin near the wound.  You need to change the bandage often due to fluid, blood, or pus coming from the wound.  You have a new rash.  You have numbness around the wound.  Get help right away if:  You have very bad swelling around the wound.  Your pain suddenly gets worse and is very bad.  You have painful lumps near the wound or on skin that is anywhere on your body.  You have a red streak going away from the wound.  The wound is on your hand or foot and you cannot move a finger or toe like normal.  The wound is on your hand or foot and you notice that your fingers or toes look pale or bluish.  This information is not intended to replace advice given to you by your health care provider. Make sure you discuss any questions you have with your health care provider. Ankle Fracture    The ankle joint is made up of the lower (distal) sections of the lower leg bones, called the tibia and fibula, along with a bone in the foot called the talus. An ankle fracture is a break in one, two, or all three of these sections of bone. There are two general types of ankle fractures:  •Stable fracture. This happens when one of the bones is broken, but the bones of the ankle joint stay in their normal positions.      •Unstable fracture. This type can include more than one broken bone. It can also happen if the outer bone is broken and the strong tissues that connect bones to each other (ligaments) are also injured at the inner ankle. This type of fracture allows the talus to move out of its normal position.        What are the causes?  This condition may be caused by:  •A hard, direct hit to the ankle.      •Quickly and severely twisting your ankle, often while your foot is planted and the rest of your body is moving.      •Trauma, such as from a car crash or a fall from a height.        What increases the risk?  The following factors may make you more likely to develop this condition:  •Being overweight.      •Participating in sports that involve quick direction changes, as in soccer.      •Doing high-impact sports such as gymnastics or football.        What are the signs or symptoms?  Symptoms of this condition include:  •A tender and swollen ankle.      •Bruising around your injured ankle.      •Pain when moving or pressing on your ankle.      •Trouble walking or using your ankle to support your body weight (putting weight on your ankle).      •Pain that gets worse when you move your foot or ankle or when you stand.      •Pain that gets better with rest.        How is this diagnosed?    An ankle fracture is usually diagnosed with a physical exam and X-rays. You may also have a CT scan or an MRI.      How is this treated?    Treatment for this condition depends on the type of ankle fracture you have. Stable fractures are treated with a cast, boot, or splint to hold the ankle still and crutches to avoid putting weight on the ankle until the fracture heals. Unstable fractures require surgery to ensure that the bones heal properly. After surgery, you will have a splint. After your incision has healed, your surgeon may give you a cast or a boot. You will not be able to put weight on your injured side for several weeks.    After your ankle has healed, you will do physical therapy exercises to improve movement and strength in your ankle.      Follow these instructions at home:    If you have a boot or splint:     •Wear the boot or splint as told by your health care provider. Remove it only as told by your health care provider.      •Loosen it if your toes tingle, become numb, or turn cold and blue.      •Keep it clean and dry.      If you have a cast:     • Do not put pressure on any part of the cast until it is fully hardened. This may take several hours.      • Do not stick anything inside the cast to scratch your skin. Doing that increases your risk of infection.      •Check the skin around the cast every day. Tell your health care provider about any concerns.      •You may put lotion on dry skin around the edges of the cast. Do not put lotion on the skin underneath the cast.      •Keep it clean and dry.      Bathing     • Do not take baths, swim, or use a hot tub until your health care provider approves. Ask your health care provider if you may take showers. You may only be allowed to take sponge baths.    •If the cast, boot, or splint is not waterproof:  •Do not let it get wet.      •Cover it with a watertight covering when you take a bath or shower.          Managing pain, stiffness, and swelling    •If directed, put ice on the injured area. To do this:  •If you have a removable splint or boot, remove it as told by your health care provider.      •Put ice in a plastic bag.      •Place a towel between your skin and the bag or between your cast and the bag.      •Leave the ice on for 20 minutes, 2–3 times a day.      •Remove the ice if your skin turns bright red. This is very important. If you cannot feel pain, heat, or cold, you have a greater risk of damage to the area.        •Move your toes often to reduce stiffness and swelling.      •Raise (elevate) the injured area above the level of your heart while you are sitting or lying down.      Activity     •Do exercises as told by your health care provider.      •Return to your normal activities as told by your health care provider. Ask your health care provider what activities are safe for you.      • Do not use the injured limb to support your body weight until your health care provider says that you can. Use crutches as told by your health care provider.      General instructions     •Take over-the-counter and prescription medicines only as told by your health care provider.      •Ask your health care provider when it is safe to drive if you have a cast, boot, or splint on your ankle.      • Do not use any products that contain nicotine or tobacco, such as cigarettes, e-cigarettes, and chewing tobacco. These can delay bone healing. If you need help quitting, ask your health care provider.      •Keep all follow-up visits. This is important.        Contact a health care provider if:    •You have pain or swelling that gets worse or does not get better with rest or medicine.      •Your cast gets damaged.        Get help right away if:    •You have severe pain that lasts.      •You develop new pain or swelling.      •Your skin or toenails below the injury turn blue or gray, feel cold, become numb, or are less sensitive to the touch.        Summary    •An ankle fracture can be stable or unstable. This is determined after a physical exam and imaging studies such as X-rays, a CT scan, or an MRI.      •Stable fractures are treated with a cast, boot, or splint to hold the ankle still until the fracture heals. Unstable fractures require surgery to ensure that the bones heal properly.      •You will not be able to put weight on your injured side for several weeks.      •Medicines, icing, and raising (elevating) your injured ankle when you are sitting or lying down may help with pain relief. Follow instructions as told by your health care provider.      This information is not intended to replace advice given to you by your health care provider. Make sure you discuss any questions you have with your health care provider.      Sutured Wound Care  ImageSutures are stitches that can be used to close wounds. Taking care of your wound properly can help prevent pain and infection. It can also help your wound to heal more quickly.    How is this treated?  Wound Care     Keep the wound clean and dry.  If you were given a bandage (dressing), change it at least one time per day or as told by your doctor. You should also change it if it gets wet or dirty.  Keep the wound completely dry for the first 24 hours or as told by your doctor. After that time, you may shower or bathe. However, make sure that the wound is not soaked in water until the sutures have been removed.  Clean the wound one time each day or as told by your doctor.    Wash the wound with soap and water.  Rinse the wound with water to remove all soap.  Pat the wound dry with a clean towel. Do not rub the wound.    After cleaning the wound, put a thin layer of antibiotic ointment on it as told your doctor. This ointment:    Helps to prevent infection.  Keeps the bandage from sticking to the wound.    Have the sutures removed as told by your doctor.  General Instructions     Take or apply medicines only as told by your doctor.  To help prevent scarring, make sure to cover your wound with sunscreen whenever you are outside after the sutures are removed and the wound is healed. Make sure to wear a sunscreen of at least 30 SPF.  If you were prescribed an antibiotic medicine or ointment, finish all of it even if you start to feel better.  Do not scratch or pick at the wound.  Keep all follow-up visits as told by your doctor. This is important.  Check your wound every day for signs of infection. Watch for:    Redness, swelling, or pain.  Fluid, blood, or pus.    Raise (elevate) the injured area above the level of your heart while you are sitting or lying down, if possible.  Avoid stretching your wound.  Drink enough fluids to keep your pee (urine) clear or pale yellow.  Contact a doctor if:  You were given a tetanus shot and you have any of these where the needle went in:    Swelling.  Very bad pain.  Redness.  Bleeding.    You have a fever.  A wound that was closed breaks open.  You notice a bad smell coming from the wound.  You notice something coming out of the wound, such as wood or glass.  Medicine does not help your pain.  You have any of these at the site of the wound.    More redness.  More swelling.  More pain.    You have any of these coming from the wound.    Fluid.  Blood.  Pus.    You notice a change in the color of your skin near the wound.  You need to change the bandage often due to fluid, blood, or pus coming from the wound.  You have a new rash.  You have numbness around the wound.  Get help right away if:  You have very bad swelling around the wound.  Your pain suddenly gets worse and is very bad.  You have painful lumps near the wound or on skin that is anywhere on your body.  You have a red streak going away from the wound.  The wound is on your hand or foot and you cannot move a finger or toe like normal.  The wound is on your hand or foot and you notice that your fingers or toes look pale or bluish.  This information is not intended to replace advice given to you by your health care provider. Make sure you discuss any questions you have with your health care provider.

## 2022-01-05 NOTE — ED STATDOCS - CLINICAL SUMMARY MEDICAL DECISION MAKING FREE TEXT BOX
61M presents to the ED s/p slip and fall on ice. happened this morning. didn't hit head no LOC. left ankle twisted. 8/10 pain tried motrin with some help. no pain to chest abd back or hips. also with small lac to left forearm. tdap up to date. exam with + tenderness and swelling to left ankle. no foot pain no pain to knee normal pulses compartments soft  . will obtain xray lac repair reassess. Neftaly Toribio M.D., Attending Physician

## 2022-01-05 NOTE — ED STATDOCS - ATTENDING CONTRIBUTION TO CARE
I, Neftaly Toribio MD, personally saw the patient with ACP.  I have personally performed a face to face diagnostic evaluation on this patient.  I have reviewed the ACP note and agree with the history, exam, and plan of care, except as noted.   The initial assessment was performed by myself and then the patient was handed off to the ACP. The patient was followed and re-evaluated by the ACP. All labs, imaging and procedures were evaluated and performed by the ACP and I was available for consultation if any questions in the patients care came up.

## 2022-01-05 NOTE — ED ADULT TRIAGE NOTE - CHIEF COMPLAINT QUOTE
Pt presents to ED for fall this am. Pt endorsing left ankle pain. Deformity noted to site. Denies head strike, denies loc. Denies any ac use.

## 2022-01-05 NOTE — ED STATDOCS - OBJECTIVE STATEMENT
61M presents to the ED s/p slip and fall on ice. happened this morning. didn't hit head no LOC. left ankle twisted. 8/10 pain tried motrin with some help. no pain to chest abd back or hips. also with small lac to left forearm. tdap up to date.

## 2022-01-05 NOTE — ED STATDOCS - CARE PLAN
1 Principal Discharge DX:	Ankle fracture, left  Secondary Diagnosis:	Laceration of elbow  Secondary Diagnosis:	Fall due to ice or snow

## 2022-01-05 NOTE — CONSULT NOTE ADULT - SUBJECTIVE AND OBJECTIVE BOX
Orthopedics Consult Note:    This is a 61y Male who presents to the ED today with c/o right ankle pain, deformity, swelling and inability to bear weight s/p slip and fall down about 5 steps on his front porch. Pt reports his left leg slipped out forward and he collapsed onto the right ankle. Pt reports a small laceration over left forearm but otherwise denies any other injuries. Pt denies head trauma or LOC. Pt denies any numbness, tingling or paresthesias. Pt is a community ambulator without use of any assistive devices at baseline. Pt reports Dr. Chi is his private Orthopedist.    Past Medical and Surgical History:  MEWS Score    Diabetes    Arthritis    Back pain    Ankle fracture, left    H/O hernia repair    fall    90+    Laceration of elbow    Fall due to ice or snow    [email or web address]        Allergies:  No Known Allergies      Vitals:  T(C): 36.7 (01-05-22 @ 12:51), Max: 36.7 (01-05-22 @ 12:51)  HR: 88 (01-05-22 @ 12:51) (88 - 88)  BP: 112/92 (01-05-22 @ 12:51) (112/92 - 112/92)  RR: 16 (01-05-22 @ 12:51) (16 - 16)  SpO2: 98% (01-05-22 @ 12:51) (98% - 98%)    Labs:  None              Imaging:  X-rays of the right ankle and tib-fib demonstrate a mari b lateral mal ankle fracture.   Stress view performed demonstrating medial clear space widening.    Physical Exam:  PE right ankle:  +moderate-large mild swelling over ankle, +ecchymosis distal to medial and lateral mals, +small ~1.5cm abrasion just proximal to medial mal, otherwise skin intact. Normothermic. +TTP over lateral mal and deltoid ligament. LROM 2' pain. Moving all toes, +EHL/FHL. SILT throughout. DP and PT pulses 2+.    Secondary Survey:  Left ulnar forarm with ~5mm laceration, otherwise right hip, right femur, right knee, LLE and B/L UEs with no swelling, no ecchymoses, no abrasions, no lacerations or any other signs of injury with full painless baseline ROM and no bony TTP. Able to SLR B/L LEs. No pain with axial loading of B/L LEs (RLE loaded through the knee). No pain with log roll B/L LEs. Sensation intact distally, moving all digits. Distal pulses intact.     Spine and ribs with no bony TTP.     Assessment:  61y Male with a right savage equivalent ankle fracture s/p slip and fall down 5 steps today.    Procedure:  Closed reduction of right ankle fracture and application of trilam splint performed after an ankle block administered using 10cc of 1% lidocaine. Pt tolerated procedure well with improved pain, moving all toes, sensation intact distally, cap refill <2secs.    Plan:  Post-reduction X-rays of the right ankle demonstrate improved fracture alignment.  NWB RLE with trilam splint.  Pain control.  Ice application.  RLE elevation.  Pt advised surgical fixation of right ankle fracture will be necessary.  Pt would like to follow up with Dr. Chi.  Follow-up with Dr. Chi vs. Dr. Zarate in the office within 3-5 days; call office for appointment.  Return to ED immediately if severe pain, severe swelling, numbness/tingling, unable to move toes or toes changing color.     Case discussed with Dr. Zarate who agrees with plan.

## 2022-01-05 NOTE — ED STATDOCS - CARE PROVIDER_API CALL
Faraz Zarate (DO)  Orthopaedic Surgery  [address] [place] [address]  Phone: [phone]  Fax: [phone]  Follow Up Time:

## 2022-01-05 NOTE — ED STATDOCS - PROGRESS NOTE DETAILS
Xr with +laterl mal fx, displaced, spoke with ortho will come see pt, lac repaired w/o complication, wound care and S&S of infection discussed   Frida Schmid PA-C pt seen and splinted by ortho, will need surgery once swelling goes down, will dc home with crutches, pt able to ambulate with crutches, outpt f/u with ortho, pt agreeable to d/c and plan of care  Frida Schmid PA-C

## 2022-01-05 NOTE — ED STATDOCS - PATIENT PORTAL LINK FT
You can access the FollowMyHealth Patient Portal offered by [facility] by registering at the following website: [email or web address]. By joining [employer]’s FollowMyHealth portal, you will also be able to view your health information using other applications (apps) compatible with our system.

## 2022-01-05 NOTE — ED STATDOCS - PHYSICAL EXAMINATION
Constitutional: NAD AAOx3  Eyes: PERRLA EOMI  Head: Normocephalic atraumatic  ENT: No pierce sign, no raccoon eyes,  no nasal septal hematoma  Mouth: MMM  Cardiac: regular rate   Resp: Lungs CTAB  GI: Abd s/nt/nd  Neuro: CN2-12 intact GCS 15  Skin: 1cm lac to left foremar no bruising to chest, back, abdomen or extremities  Msk: No midline spinal ttp, full ROM of neck, c-collar cleared clinically and with provocative testing, no ttp of facial bones, no ttp to chest wall, pelvis stable, + tenderness and swelling to left ankle. no foot pain no pain to knee normal pulses compartments soft

## 2022-01-07 NOTE — ED POST DISCHARGE NOTE - RESULT SUMMARY
Patient called inquiring the frequency of taking his medication. Advised to take Tylenol and Motrin, then take Oxycodone as prescribed for breakthrough pain. Reiterated RICE therapy and follow up with orthopedics. Patient given signs of compartment syndrome and return precautions. All questions answered and patient verbalized understanding. Juan Carlos Kaufman, PGY3

## 2022-01-08 ENCOUNTER — EMERGENCY (EMERGENCY)
Facility: HOSPITAL | Age: 62
LOS: 0 days | Discharge: HOME | End: 2022-01-08
Attending: EMERGENCY MEDICINE
Payer: COMMERCIAL

## 2022-01-08 VITALS
HEART RATE: 88 BPM | OXYGEN SATURATION: 99 % | DIASTOLIC BLOOD PRESSURE: 89 MMHG | TEMPERATURE: 98 F | SYSTOLIC BLOOD PRESSURE: 156 MMHG | RESPIRATION RATE: 20 BRPM

## 2022-01-08 VITALS — HEIGHT: 71 IN | WEIGHT: 195.11 LBS

## 2022-01-08 DIAGNOSIS — S82.62XA DISPLACED FRACTURE OF LATERAL MALLEOLUS OF LEFT FIBULA, INITIAL ENCOUNTER FOR CLOSED FRACTURE: ICD-10-CM

## 2022-01-08 DIAGNOSIS — Z79.84 LONG TERM (CURRENT) USE OF ORAL HYPOGLYCEMIC DRUGS: ICD-10-CM

## 2022-01-08 DIAGNOSIS — S82.842A DISPLACED BIMALLEOLAR FRACTURE OF LEFT LOWER LEG, INITIAL ENCOUNTER FOR CLOSED FRACTURE: ICD-10-CM
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
## 2022-01-08 DIAGNOSIS — R23.8 OTHER SKIN CHANGES: ICD-10-CM
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
## 2022-01-08 DIAGNOSIS — M19.90 UNSPECIFIED OSTEOARTHRITIS, UNSPECIFIED SITE: ICD-10-CM

## 2022-01-08 DIAGNOSIS — E11.9 TYPE 2 DIABETES MELLITUS WITHOUT COMPLICATIONS: ICD-10-CM

## 2022-01-08 DIAGNOSIS — M25.572 PAIN IN LEFT ANKLE AND JOINTS OF LEFT FOOT: ICD-10-CM

## 2022-01-08 DIAGNOSIS — Z98.890 OTHER SPECIFIED POSTPROCEDURAL STATES: Chronic | ICD-10-CM

## 2022-01-08 PROCEDURE — 73610 X-RAY EXAM OF ANKLE: CPT | Mod: 26,LT,59

## 2022-01-08 PROCEDURE — 29515 APPLICATION SHORT LEG SPLINT: CPT | Mod: LT

## 2022-01-08 PROCEDURE — 73590 X-RAY EXAM OF LOWER LEG: CPT | Mod: 26,LT,59

## 2022-01-08 PROCEDURE — 99284 EMERGENCY DEPT VISIT MOD MDM: CPT | Mod: 25

## 2022-01-08 PROCEDURE — 73590 X-RAY EXAM OF LOWER LEG: CPT | Mod: LT

## 2022-01-08 PROCEDURE — 99284 EMERGENCY DEPT VISIT MOD MDM: CPT

## 2022-01-08 PROCEDURE — 73610 X-RAY EXAM OF ANKLE: CPT | Mod: LT

## 2022-01-08 RX ORDER — IBUPROFEN 200 MG
1 TABLET ORAL
Qty: 10 | Refills: 0
Start: 2022-01-08 | End: 2022-01-12

## 2022-01-08 RX ORDER — IBUPROFEN 200 MG
800 TABLET ORAL ONCE
Refills: 0 | Status: COMPLETED | OUTPATIENT
Start: 2022-01-08 | End: 2022-01-08

## 2022-01-08 RX ADMIN — Medication 800 MILLIGRAM(S): at 21:29

## 2022-01-08 NOTE — CONSULT NOTE ADULT - SUBJECTIVE AND OBJECTIVE BOX
61M presents to  ED for evaluation of left ankle fracture. Patient reports that he was previously seen here in the Emergency Department on Wednesday where he was diagnosed with a left bimalleolar equivalent ankle fracture and placed in a trilam splint and advised to follow up as outpatient to discuss surgical options. Patient reports at that time he was educated on signs and symptoms of compartment syndrome. He states that after his shower today he noticed "blueness" in his toes so decided to come in for evaluation due to concern for decreased blood flow. He reports that his pain has improved tremendously since discharge, and that Ibuprofen has helped. Otherwise he denies new trauma/falls, numbness/tingling, weakness, or any other acute complaints.     Vital Signs Last 24 Hrs  T(C): 36.8 (08 Jan 2022 19:44), Max: 36.8 (08 Jan 2022 19:44)  T(F): 98.3 (08 Jan 2022 19:44), Max: 98.3 (08 Jan 2022 19:44)  HR: 88 (08 Jan 2022 19:44) (88 - 88)  BP: 156/89 (08 Jan 2022 19:44) (156/89 - 156/89)  BP(mean): 109 (08 Jan 2022 19:44) (109 - 109)  RR: 20 (08 Jan 2022 19:44) (20 - 20)  SpO2: 99% (08 Jan 2022 19:44) (99% - 99%)    Exam:  General: Awake alert no acute distress, comfortable watching football  LLE:   Splint CDI.   Scattered areas of ecchymosis over toes.   Wiggles all toes.  Sensation intact throughout  Cap refill intact, toes warm and well perfused  Compartments soft and compressible  No contralateral calf TTP    Imaging reviewed with maintained alignment of bimalleolar ankle fracture

## 2022-01-08 NOTE — ED STATDOCS - OBJECTIVE STATEMENT
61 year old male with PMHx of arthritis, back pain, diabetes, presents to the ED c/o bluish discoloration to left toes and splint tightness. Pt broke L ankle on Wednesday and was placed into a splint. Pt noticed blue discoloration to left toes, and came into ED for evaluation because he wanted to ensure that his splint was not cutting off blood supply to toes. Pt states that his toes are warm and is able to move them. Pt has been following RICE protocol for his ankle. Pt has FU with orthopedist next week. Denies numbness or tingling. Pt reports pressure in inside of left ankle from cast. 61 year old male with PMHx of arthritis, back pain, diabetes, presents to the ED c/o discoloration and bruising to left toes and splint tightness sensation s/p previous placement of splint. Pt broke L ankle on Wednesday and was placed into a splint. Pt noticed discoloration bruising to the left toes, and came into ED for evaluation because he wanted to ensure that his splint was OK. Able to wiggle toes, no paresthesias, no loss of sensation in toes. Pt states that his toes are warm and is able to move them. Pt has been following RICE protocol for his ankle. Pt has FU with orthopedist next week. Denies numbness or tingling. Pt reports pressure in inside of left ankle from cast. No other complaints including no limb swelling, no cp, sob or palpitation, no recent trauma, no visual complaints, no neck stiffness, no focal neurological complaints, no dysuria hematuria or frequency, no saddle anesthesia, no incontinence of urine or stool.

## 2022-01-08 NOTE — ED STATDOCS - NEUROLOGICAL, MLM
sensation is normal and strength is normal. sensation is normal and strength is normal. CNs 2-12 intact. GCS=15

## 2022-01-08 NOTE — ED STATDOCS - ATTENDING CONTRIBUTION TO CARE
I Sim Valdez MD saw and examined the patient. MLP saw and examined the patient under my supervision. I discussed the care of the patient with MLP and agree with MLP's plan, assessment and care of the patient while in the ED.

## 2022-01-08 NOTE — ED STATDOCS - PLAN OF CARE
ankle fracture, splint in place, seen by ortho and cleared to go home, via Dr. Zarate/ortho team ankle fracture, splint in place, seen by ortho and cleared to go home, via Dr. Zarate/ortho team (consult is appreciated)

## 2022-01-08 NOTE — CONSULT NOTE ADULT - ASSESSMENT
61M with left bimalleolar equivalent ankle fracture, follow up for concern of complications    Plan:  New imaging reviewed without change, maintained fracture alignment  Toes ecchymotic. Explained to patient that this is likely due to gravity causing swelling/bruising to migrate towards his foot  No signs of compartment syndrome. Patient re-educated on these signs and symptoms and provided reassurance.   Recommend pain control PRN  Discussed likely surgical procedure with patient. All questions answered to patient satisfaction.  No acute orthopedic surgical indication at this time. Ortho stable for discharge. Patient to follow up with Dr. Zarate or Dr. Chi as outpatient for further evaluation and treatment  Discussed with attending dr. Zarate who agrees with plan

## 2022-01-08 NOTE — ED ADULT TRIAGE NOTE - CHIEF COMPLAINT QUOTE
Pt states he broke his L ankle on Wed. Pt noticed today he had a bluish discoloration to his toes and came in to see if the splint was too tight. Pt with +cap refill, denies numbness/tingling. Pt able to wiggle toes. Swelling and bruising noted to toes. No other complaints.

## 2022-01-08 NOTE — ED STATDOCS - CARE PROVIDER_API CALL
Faraz Zarate (DO)  Orthopaedic Surgery  [address] [place] [address]  Phone: [phone]  Fax: [phone]  Follow Up Time:     Yang Devi (DO)  Orthopaedic Surgery  [address] [place] [address]  Phone: [phone]  Fax: [phone]  Follow Up Time:

## 2022-01-08 NOTE — ED STATDOCS - CARE PLAN
1 Principal Discharge DX:	Ankle fracture  Goal:	ankle fracture, splint in place, seen by ortho and cleared to go home, via Dr. Zarate/ortho team   Principal Discharge DX:	Ankle fracture  Goal:	ankle fracture, splint in place, seen by ortho and cleared to go home, via Dr. Zarate/ortho team (consult is appreciated)

## 2022-01-08 NOTE — ED STATDOCS - ENMT, MLM
Nasal mucosa clear.  Mouth with normal mucosa  Throat has no vesicles, no oropharyngeal exudates and uvula is midline. Nasal mucosa clear.  Mouth with normal mucosa. No epistaxis.

## 2022-01-08 NOTE — ED STATDOCS - NS_ ATTENDINGSCRIBEDETAILS _ED_A_ED_FT
I Sim Valdez MD saw and examined the patient. Scribe documented for me and under my supervision. I have modified the scribe's documentation where necessary to reflect my history, physical exam and other relevant documentations pertinent to the care of the patient.

## 2022-01-08 NOTE — ED STATDOCS - PATIENT PORTAL LINK FT
You can access the FollowMyHealth Patient Portal offered by [facility] by registering at the following website: [email or web address]. By joining [employer]’s FollowMyHealth portal, you will also be able to view your health information using other applications (apps) compatible with our system.

## 2022-01-08 NOTE — ED STATDOCS - CHPI ED SYMPTOM NEG
-numbness, -tingling NO numbness, or tingling/no burning urination/no fever/no hematuria/no nausea/no abdominal distention/no blood in stool/no diarrhea/no dysuria/no vomiting/no palpitations

## 2022-01-08 NOTE — ED ADULT NURSE NOTE - OBJECTIVE STATEMENT
patient axox3, c/o bluish discoloration to left toes and splint tightness. patient states he feels like his cast is tight on the left side of his ankle. patient states he broke his left ankle on Wednesday and was placed in a splint. patient noticed blue discoloration to left toes approx 2.5 hours ago, and came into ED for evaluation because he wanted to ensure that his splint was not cutting off blood supply to toes. patient states that his toes are warm and is able to move them. patient states he has been following RICE protocol for his ankle. patient has follow up with ortho next week. patient denies numbness or tingling, headache, vision changes, n/v/d, fever, chills, cough, chest pain, SOB. no pain with passive movement.

## 2022-01-08 NOTE — ED STATDOCS - MUSCULOSKELETAL, MLM
range of motion is not limited and there is no muscle tenderness. 2+ radial arteries, right DP, cap refill less than 3 seconds all digits of LLE. able to wiggle toes in LLE. range of motion is not limited and there is no muscle tenderness. 2+ b/l radial arteries, right DP, cap refill less than 2 seconds all digits of LLE. popliteal pulses 2+ intact in both limbs.  able to wiggle toes in LLE. Diffuse bruising and mild swelling in the toes in the affected limb. Able to flex and extend the toes. Sensation intact. NVI otherwise unremarkable with limitation due to cast in place to assess underneath

## 2022-01-08 NOTE — ED STATDOCS - PROGRESS NOTE DETAILS
Spoke with ortho. Will come to see pt. -Yole Bruce PA-C 62 yo male presents with L foot/toe swelling, discoloration and mild pain to the ankle. Pt was seen int  ED 3 days ago for a ankle fracture and was splinted by the ortho PA. Pt states he was having a lot of pain the first 2 days and today felt a little better. Noticed a small indentation to the medial aspect of the L ankle. FROM of the L toes with ecchymosis noted to the toes. Toes warm to touch. Will obtain XR and consult ortho. -Yoel Bruce PA-C

## 2022-01-08 NOTE — ED STATDOCS - NSFOLLOWUPINSTRUCTIONS_ED_ALL_ED_FT
Please note that you were seen in the emergency room and you were seen by the orthopedic physician team on behalf of Faraz Stanford. Please note that your fracture as per orthopedic team is stable, and we asked them to evaluate the risk of ulceration or other potential complications, but they state that in their exam they do not see any evidence of any findings requiring acute intervention. Please follow up with your orthopedic physician. I have provided you with the number for Dr. Zarate and if you can not see him or if the office has no appointment I have also provided you for an additional contact number for a local orthopedic physician. Please return to us if you have any nausea, vomiting, chest pain, palpitation, lightheadedness, or if any other health concerns. Please continue to eat and hydrate as you normally do. If you have any tingling in your toes, increased pain or if any other health concerns return to us immediately.    ______________________      Ankle Fracture    WHAT YOU NEED TO KNOW:    An ankle fracture is a break in 1 or more of the bones in your ankle.    Foot Anatomy         DISCHARGE INSTRUCTIONS:    Call your local emergency number (911 in the [place]) for any of the following:   •You feel lightheaded, short of breath, and have chest pain.      •You cough up blood.      Return to the emergency department if:   •Your leg feels warm, tender, and painful. It may look swollen and red.      •Blood soaks through your bandage.      •You have severe pain in your ankle.      •Your cast feels too tight.      •Your foot or toes are cold or numb.      •Your foot or toenails turn blue or gray.      Call your doctor if:   •Your splint feels too tight.      •Your swelling has increased or returned.      •You have a fever.      •Your pain does not go away, even after treatment.      •You have questions or concerns about your condition or care.      Medicines: You may need any of the following:   •Acetaminophen decreases pain and fever. It is available without a doctor's order. Ask how much to take and how often to take it. Follow directions. Read the labels of all other medicines you are using to see if they also contain acetaminophen, or ask your doctor or pharmacist. Acetaminophen can cause liver damage if not taken correctly. Do not use more than 4 grams (4,000 milligrams) total of acetaminophen in one day.       •NSAIDs, such as ibuprofen, help decrease swelling, pain, and fever. This medicine is available with or without a doctor's order. NSAIDs can cause stomach bleeding or kidney problems in certain people. If you take blood thinner medicine, always ask your healthcare provider if NSAIDs are safe for you. Always read the medicine label and follow directions.      •Prescription pain medicine may be given. Ask your healthcare provider how to take this medicine safely. Some prescription pain medicines contain acetaminophen. Do not take other medicines that contain acetaminophen without talking to your healthcare provider. Too much acetaminophen may cause liver damage. Prescription pain medicine may cause constipation. Ask your healthcare provider how to prevent or treat constipation.       •Take your medicine as directed. Contact your healthcare provider if you think your medicine is not helping or if you have side effects. Tell him or her if you are allergic to any medicine. Keep a list of the medicines, vitamins, and herbs you take. Include the amounts, and when and why you take them. Bring the list or the pill bottles to follow-up visits. Carry your medicine list with you in case of an emergency.      Self-care:     R.I.C.E.     •Rest your ankle so that it can heal. Return to normal activities as directed.      •Apply ice on your ankle for 15 to 20 minutes every hour or as directed. Use an ice pack, or put crushed ice in a plastic bag. Cover it with a towel. Ice helps prevent tissue damage and decreases swelling and pain.      •Use a support device, such as a brace, cast, or splint to limit your movement and protect your ankle. You may need to use crutches to protect your ankle and decrease your pain as you move around. Do not remove your device and do not put weight on your injured ankle.      •Elevate your ankle above the level of your heart as often as you can. This will help decrease swelling and pain. Prop your ankle on pillows or blankets to keep it elevated comfortably.      Splint and cast care: Cover the splint or cast before you bathe so it does not get wet. Tape 2 plastic trash bags to your skin above the cast. Try to keep your ankle out of the water as much as possible.    Follow up with your doctor in 1 to 2 days, or as directed: Your fracture may need to be reduced (bones pushed back into place) or you may need surgery. Write down your questions so you remember to ask them during your visits.    _______________      Splint Care    WHAT YOU NEED TO KNOW:    Splint care is important to help protect your splint until it comes off. Some splints are made of fiberglass or plaster that will need to dry and harden. Splint care will help the splint dry and harden correctly. Even after your splint hardens, it can be damaged.    DISCHARGE INSTRUCTIONS:    Return to the emergency department if:   •You have increased pain.      •Your fingers or toes are numb or tingling.      •You feel burning or stinging around your injury.      •Your nails, fingers, or toes turn pale, blue, or gray, and feel cold.      •You have new or increased trouble moving your fingers or toes.      •Your swelling gets worse.      •The skin under your splint is bleeding or leaking pus.       Contact your healthcare provider if:   •Your hard splint gets wet or is damaged.      •You have a fever.      •Your splint feels tighter.      •You have itchy, dry skin under your splint that is getting worse.      •The skin under your splint is red, or you have a new sore.      •You notice a bad smell coming from your splint.       •You have questions or concerns about your condition or care.      How to care for your splint:   •Wait for your hard splint to harden completely. You may have to wait up to 3 days before you can walk on a plaster splint.      •Check your splint and the skin around it each day. Check your splint for damage, such as cracks and breaks. Check your skin for redness, increased swelling, and sores. Loosen the elastic bandage around your splint if it feels too tight.      •Keep your splint clean and dry. Keep dirt out of your splint. Before you bathe, wrap your hard splint with 2 layers of plastic. Then put a plastic bag over it. Keep the plastic bag tightly sealed. You can also ask your healthcare provider about waterproof shields. Do not put your hard splint in the water, even with a plastic bag over it. A wet splint can make your skin itchy, and may lead to infection.      •Do not put powders or deodorants inside your splint. These can dry your skin and increase itching.       •Do not try to scratch the skin inside your hard splint with sharp objects. Sharp objects can break off inside your splint or hurt your skin.       •Do not pull the padding out of your splint. The padding inside your splint protects your skin. You may develop a sore on your skin if you take out the padding.      Follow up with your healthcare provider as directed within 1 to 2 weeks: Write down your questions so you remember to ask them during your visits.

## 2022-01-10 ENCOUNTER — RESULT CHARGE (OUTPATIENT)
Age: 62
End: 2022-01-10

## 2022-01-10 ENCOUNTER — APPOINTMENT (OUTPATIENT)
Dept: ORTHOPEDIC SURGERY | Facility: CLINIC | Age: 62
End: 2022-01-10
Payer: OTHER MISCELLANEOUS

## 2022-01-10 ENCOUNTER — NON-APPOINTMENT (OUTPATIENT)
Age: 62
End: 2022-01-10

## 2022-01-10 PROCEDURE — 73610 X-RAY EXAM OF ANKLE: CPT | Mod: LT

## 2022-01-10 PROCEDURE — 99204 OFFICE O/P NEW MOD 45 MIN: CPT

## 2022-01-10 NOTE — PHYSICAL EXAM
[de-identified] : Left ankle physical exam limited due to the instability of the fracture and morphology of the fracture.  An anterior window was made and there was no fracture blisters present.  There is bruising and swelling in the foot and toes.  He can wiggle his toes.  Neurovascularly intact.  Dorsalis pedis pulse is normal.  Capillary refill in the digits is normal. [de-identified] : 3 views x-rays left ankle reviewed, 1/10/2022: Bimalleolar fracture of the left ankle. \par \par \par ACC: [id number] EXAM: XR ANKLE COMP MIN 3 VIEWS LT\par \par PROCEDURE DATE: 01/05/2022\par \par \par \par INTERPRETATION: Radiographs of the LEFT ankle\par \par CLINICAL INFORMATION: Trauma Pain.\par \par TECHNIQUE: Frontal, oblique and lateral views of the ankle were obtained.\par \par FINDINGS: No prior examinations are available for review.\par \par There is a Salguero B oblique fracture of distal fibula with adjacent soft tissue swelling.\par There is medial dislocation of the tibia with widened medial clear space measuring 1.6 cm. No other fracture.\par \par IMPRESSION: LEFT ankle fracture dislocation as described.\par Diffuse soft tissue swelling.\par \par --- End of Report ---\par \par \par \par \par \par AMELIE HOLLOWAY MD; Attending Radiologist\par This document has been electronically signed. Jan 5 2022 9:07PM

## 2022-01-10 NOTE — ADDENDUM
[FreeTextEntry1] : I, Emely Boykin, acted solely as a scribe for Dr. Yang Devi on this date 01/10/2022.\par \par All medical record entries made by the Scribe were at my, Dr. Yang Devi, direction and personally dictated by me on 01/10/2022 . I have reviewed the chart and agree that the record accurately reflects my personal performance of the history, physical exam, assessment and plan. I have also personally directed, reviewed, and agreed with the chart.	\par

## 2022-01-10 NOTE — HISTORY OF PRESENT ILLNESS
[FreeTextEntry1] : 1/10/2021: The patient is a 61-year-old male with a medical history of Diabetes Mellitus Type 2 who presents with a left ankle fracture/dislocation from this past Wednesday after he slipped and fell on black ice.  He went to the emergency department where they relocated the ankle and placed him in a protective splint.  He states he has been nonweightbearing and presents today with crutches.  He denies fevers, chills, night's, shortness of breath, calf pain.  He does have swelling in the toes but has full sensation in the foot.  Today, his pain is controlled.  He has no other complaints today in the office.

## 2022-01-10 NOTE — DISCUSSION/SUMMARY
[de-identified] : Today I had a lengthy discussion with the patient regarding their left ankle, bimalleolar fracture. I have addressed all the patient's concerns surrounding the pathology of their condition. XR imaging was completed in office today and results were reviewed with the patient. Further, XR results were reviewed with the patient from [facility] ED.\par \par A lengthy discussion was had about the surgery ORIF of the left ankle. All risks, benefits and alternatives to the recommended surgical procedure were discussed which include but are not limited to bleeding, infection, nerve damage, vascular damage, failure of the wound to heal, the need for further surgery, loss of limb, DVT, PE, loss of life as well as the risks associated with general anesthesia. The patient will need medical clearance prior to surgery, including but not limited to a COVID-19 PCR Test. The patient verbalized understanding and provided informed consent to move forward with surgery.\par \par In the interim, the patient will remain nonweightbearing in the protective splint applied at [facility] ED. I recommended that the patient remain nonweightbearing in the splint. He will continue to utilize the crutches for ambulation assistance PRN. I advised the patient to utilize 325 mg of Aspirin as instructed for blood thinning purposes. The prescription for the Aspirin was provided for the patient in the office today. The patient understood and verbally agreed to the treatment plan. All of their questions were answered and they were satisfied with the visit. The patient should call the office if they have any questions or experience worsening symptoms. \par

## 2022-01-11 ENCOUNTER — APPOINTMENT (OUTPATIENT)
Dept: FAMILY MEDICINE | Facility: CLINIC | Age: 62
End: 2022-01-11
Payer: COMMERCIAL

## 2022-01-11 ENCOUNTER — NON-APPOINTMENT (OUTPATIENT)
Age: 62
End: 2022-01-11

## 2022-01-11 VITALS
TEMPERATURE: 97.5 F | DIASTOLIC BLOOD PRESSURE: 88 MMHG | BODY MASS INDEX: 26.41 KG/M2 | WEIGHT: 195 LBS | HEART RATE: 91 BPM | OXYGEN SATURATION: 98 % | HEIGHT: 72 IN | SYSTOLIC BLOOD PRESSURE: 142 MMHG

## 2022-01-11 VITALS — SYSTOLIC BLOOD PRESSURE: 122 MMHG | DIASTOLIC BLOOD PRESSURE: 80 MMHG

## 2022-01-11 DIAGNOSIS — Z01.818 ENCOUNTER FOR OTHER PREPROCEDURAL EXAMINATION: ICD-10-CM

## 2022-01-11 LAB — SARS-COV-2 N GENE NPH QL NAA+PROBE: NOT DETECTED

## 2022-01-11 PROCEDURE — 93000 ELECTROCARDIOGRAM COMPLETE: CPT

## 2022-01-11 PROCEDURE — 99214 OFFICE O/P EST MOD 30 MIN: CPT | Mod: 25

## 2022-01-11 RX ORDER — GLIPIZIDE 10 MG/1
10 TABLET, FILM COATED, EXTENDED RELEASE ORAL
Refills: 0 | Status: DISCONTINUED | COMMUNITY
End: 2022-01-11

## 2022-01-11 RX ORDER — SODIUM CHLORIDE 9 MG/ML
3 INJECTION INTRAMUSCULAR; INTRAVENOUS; SUBCUTANEOUS EVERY 8 HOURS
Refills: 0 | Status: DISCONTINUED | OUTPATIENT
Start: 2022-01-12 | End: 2022-01-12

## 2022-01-11 RX ORDER — MELOXICAM 15 MG/1
15 TABLET ORAL DAILY
Refills: 0 | Status: DISCONTINUED | COMMUNITY
End: 2022-01-11

## 2022-01-11 RX ORDER — ROSUVASTATIN CALCIUM 5 MG/1
5 TABLET, FILM COATED ORAL
Qty: 1 | Refills: 2 | Status: DISCONTINUED | COMMUNITY
Start: 2021-05-19 | End: 2022-01-11

## 2022-01-11 RX ORDER — ROSUVASTATIN CALCIUM 5 MG/1
5 TABLET, FILM COATED ORAL DAILY
Qty: 90 | Refills: 0 | Status: DISCONTINUED | COMMUNITY
Start: 2021-06-02 | End: 2022-01-11

## 2022-01-11 NOTE — PLAN
[FreeTextEntry1] : See assessment.\par Optimized for surgery.\par Will return to office in 1-2 months for full PE and repeat fasting labs.

## 2022-01-11 NOTE — ASSESSMENT
[Patient Optimized for Surgery] : Patient optimized for surgery [Continue medications as is] : Continue current medications [Continue anticoagulant treatment as is] : Continue current anticoagulant treatment [FreeTextEntry4] : Patient presents to PCP office today for medical clearance.  ORIF left ankle scheduled for 11/12/2022 with Dr. Devi at [facility].  Patient with pain to the left ankle otherwise feels well.  Has had surgery/anesthesia in the past and tolerates anesthesia well.  Initial BP slightly elevated after crutching into the exam room, repeat improved to 122/80.\par \par ECG NSR without acute ischemic changes, no ST/T changes, no arrhythmias.\par Pt optimized for surgery.\par \par Wound care instructions provided in regards to the left forearm abrasion, s/p suture removal today in office.\par \par Patient due for PE and repeat fasting labs.  Patient is aware and will schedule after surgery. [FreeTextEntry5] : defer to ortho [FreeTextEntry6] : n/a

## 2022-01-11 NOTE — PHYSICAL EXAM
[No Edema] : there was no peripheral edema [Normal] : soft, non-tender, non-distended, no masses palpated, no HSM and normal bowel sounds [No Focal Deficits] : no focal deficits [de-identified] : Left lower extremity in cast to the proximal tib/fib, able to wiggle toes, brisk cap refill [de-identified] : small healing abrasion to the left forearm just distal to the elbow, granulation tissue and 1 suture present

## 2022-01-11 NOTE — ASU PATIENT PROFILE, ADULT - NSICDXPASTMEDICALHX_GEN_ALL_CORE_FT
PAST MEDICAL HISTORY:  Arthritis     Back pain     Bimalleolar fracture left ankle    Diabetes     History of peripheral neuropathy     Venous insufficiency      PAST MEDICAL HISTORY:  Arthritis     Back pain     Bimalleolar fracture left ankle    Diabetes     H/O sleep apnea     History of peripheral neuropathy     Venous insufficiency

## 2022-01-11 NOTE — HISTORY OF PRESENT ILLNESS
[No Pertinent Cardiac History] : no history of aortic stenosis, atrial fibrillation, coronary artery disease, recent myocardial infarction, or implantable device/pacemaker [No Pertinent Pulmonary History] : no history of asthma, COPD, sleep apnea, or smoking [No Adverse Anesthesia Reaction] : no adverse anesthesia reaction in self or family member [Diabetes] : diabetes [(Patient denies any chest pain, claudication, dyspnea on exertion, orthopnea, palpitations or syncope)] : Patient denies any chest pain, claudication, dyspnea on exertion, orthopnea, palpitations or syncope [Anti-Platelet Agents: _____] : Anti-Platelet Agents: [unfilled] [Chronic Anticoagulation] : no chronic anticoagulation [Chronic Kidney Disease] : no chronic kidney disease [FreeTextEntry1] : ORIF left ankle [FreeTextEntry2] : 1/12/2022 [FreeTextEntry3] : Dr. Devi [FreeTextEntry4] : Pt is a 60yo male with PMH DM2, HLD who presented to the office today for preop clearance.  Patient is scheduled for ORIF of the left ankle with Dr. Devi on 11/12/2022.  Pt sustained bimaleolar fracture of the left ankle on 1/5/2022 after a mechanical trip and fall.  Patient reports pain to the left ankle otherwise feels well.  Denies chest pain, palpitations, SOB, difficulty breathing, lightheadedness, dizziness, LOC.  Taking Oxycodone 5mg at home for pain with some improvement.  Patient is using crutches to ambulate.\par \par Patient has had multiple surgeries in the past and tolerates anesthesia well.\par Patient has no known history of cardiac or lung disease.\par Currently taking ASA 325mg daily as per Dr. Devi.  No other AC.\par \par Follows with Endocrinology, Dr. Callaway, for diabetes.  Taking Metformin and Jardiance with good glucose control.\par \par Pt has one stitch in his left forearm after fall, repaired at [facility] with 1 stitch to be removed today. [FreeTextEntry7] : No history of Cardiology.

## 2022-01-11 NOTE — ASU PATIENT PROFILE, ADULT - NS PRO ABUSE SCREEN SUSPICION NEGLECT YN
CAPRINI SCORE [CLOT updated 18]    AGE RELATED RISK FACTORS                                                       MOBILITY RELATED FACTORS  [ ] Age 41-60 years                                            (1 Point)                    [ ] Bed rest                                                        (1 Point)  [ ] Age: 61-74 years                                           (2 Points)                  [ ] Plaster cast                                                   (2 Points)  [ ] Age= 75 years                                              (3 Points)                    [ ] Bed bound for more than 72 hours                 (2 Points)    DISEASE RELATED RISK FACTORS                                               GENDER SPECIFIC FACTORS  [ ] Edema in the lower extremities                       (1 Point)              [ ] Pregnancy                                                     (1 Point)  [ ] Varicose veins                                               (1 Point)                     [ ] Post-partum < 6 weeks                                   (1 Point)             [ ] BMI > 25 Kg/m2                                            (1 Point)                     [ ] Hormonal therapy  or oral contraception          (1 Point)                 [ ] Sepsis (in the previous month)                        (1 Point)               [ ] History of pregnancy complications                 (1 point)  [ ] Pneumonia or serious lung disease                                               [ ] Unexplained or recurrent                     (1 Point)           (in the previous month)                               (1 Point)  [ ] Abnormal pulmonary function test                     (1 Point)                 SURGERY RELATED RISK FACTORS  [ ] Acute myocardial infarction                              (1 Point)               [ ]  Section                                             (1 Point)  [ ] Congestive heart failure (in the previous month)  (1 Point)      [ ] Minor surgery                                                  (1 Point)   [ ] Inflammatory bowel disease                             (1 Point)               [ ] Arthroscopic surgery                                        (2 Points)  [ ] Central venous access                                      (2 Points)                [ ] General surgery lasting more than 45 minutes (2 points)  [ ] Present or previous malignancy                     (2 Points)                [ ] Elective arthroplasty                                         (5 points)    [ ] Stroke (in the previous month)                          (5 Points)                                                                                                                                                           HEMATOLOGY RELATED FACTORS                                                 TRAUMA RELATED RISK FACTORS  [ ] Prior episodes of VTE                                     (3 Points)                [ ] Fracture of the hip, pelvis, or leg                       (5 Points)  [ ] Positive family history for VTE                         (3 Points)             [ ] Acute spinal cord injury (in the previous month)  (5 Points)  [ ] Prothrombin 11913 A                                     (3 Points)               [ ] Paralysis  (less than 1 month)                             (5 Points)  [ ] Factor V Leiden                                             (3 Points)                  [ ] Multiple Trauma within 1 month                        (5 Points)  [ ] Lupus anticoagulants                                     (3 Points)                                                           [ ] Anticardiolipin antibodies                               (3 Points)                                                       [ ] High homocysteine in the blood                      (3 Points)                                             [ ] Other congenital or acquired thrombophilia      (3 Points)                                                [ ] Heparin induced thrombocytopenia                  (3 Points)                                     Total Score [4 ]
no

## 2022-01-12 ENCOUNTER — APPOINTMENT (OUTPATIENT)
Dept: ORTHOPEDIC SURGERY | Facility: HOSPITAL | Age: 62
End: 2022-01-12

## 2022-01-12 ENCOUNTER — OUTPATIENT (OUTPATIENT)
Dept: INPATIENT UNIT | Facility: HOSPITAL | Age: 62
LOS: 1 days | Discharge: HOME | End: 2022-01-12
Payer: COMMERCIAL

## 2022-01-12 VITALS — HEART RATE: 86 BPM | OXYGEN SATURATION: 99 % | WEIGHT: 190.92 LBS | RESPIRATION RATE: 16 BRPM | TEMPERATURE: 98 F

## 2022-01-12 VITALS
SYSTOLIC BLOOD PRESSURE: 134 MMHG | RESPIRATION RATE: 17 BRPM | OXYGEN SATURATION: 98 % | DIASTOLIC BLOOD PRESSURE: 73 MMHG | TEMPERATURE: 99 F | HEART RATE: 89 BPM

## 2022-01-12 DIAGNOSIS — Z98.890 OTHER SPECIFIED POSTPROCEDURAL STATES: Chronic | ICD-10-CM

## 2022-01-12 DIAGNOSIS — S82.842A DISPLACED BIMALLEOLAR FRACTURE OF LEFT LOWER LEG, INITIAL ENCOUNTER FOR CLOSED FRACTURE: ICD-10-CM

## 2022-01-12 LAB
ANION GAP SERPL CALC-SCNC: 7 MMOL/L — SIGNIFICANT CHANGE UP (ref 5–17)
APTT BLD: 30.6 SEC — SIGNIFICANT CHANGE UP (ref 27.5–35.5)
BUN SERPL-MCNC: 17 MG/DL — SIGNIFICANT CHANGE UP (ref 7–23)
CALCIUM SERPL-MCNC: 9.1 MG/DL — SIGNIFICANT CHANGE UP (ref 8.5–10.1)
CHLORIDE SERPL-SCNC: 101 MMOL/L — SIGNIFICANT CHANGE UP (ref 96–108)
CO2 SERPL-SCNC: 27 MMOL/L — SIGNIFICANT CHANGE UP (ref 22–31)
CREAT SERPL-MCNC: 0.87 MG/DL — SIGNIFICANT CHANGE UP (ref 0.5–1.3)
GLUCOSE SERPL-MCNC: 149 MG/DL — HIGH (ref 70–99)
HCT VFR BLD CALC: 43.7 % — SIGNIFICANT CHANGE UP (ref 39–50)
HGB BLD-MCNC: 14.7 G/DL — SIGNIFICANT CHANGE UP (ref 13–17)
INR BLD: 1.14 RATIO — SIGNIFICANT CHANGE UP (ref 0.88–1.16)
MCHC RBC-ENTMCNC: 30.2 PG — SIGNIFICANT CHANGE UP (ref 27–34)
MCHC RBC-ENTMCNC: 33.6 GM/DL — SIGNIFICANT CHANGE UP (ref 32–36)
MCV RBC AUTO: 89.7 FL — SIGNIFICANT CHANGE UP (ref 80–100)
PLATELET # BLD AUTO: 370 K/UL — SIGNIFICANT CHANGE UP (ref 150–400)
POTASSIUM SERPL-MCNC: 3.7 MMOL/L — SIGNIFICANT CHANGE UP (ref 3.5–5.3)
POTASSIUM SERPL-SCNC: 3.7 MMOL/L — SIGNIFICANT CHANGE UP (ref 3.5–5.3)
PROTHROM AB SERPL-ACNC: 13.1 SEC — SIGNIFICANT CHANGE UP (ref 10.6–13.6)
RBC # BLD: 4.87 M/UL — SIGNIFICANT CHANGE UP (ref 4.2–5.8)
RBC # FLD: 12.3 % — SIGNIFICANT CHANGE UP (ref 10.3–14.5)
SODIUM SERPL-SCNC: 135 MMOL/L — SIGNIFICANT CHANGE UP (ref 135–145)
WBC # BLD: 9.34 K/UL — SIGNIFICANT CHANGE UP (ref 3.8–10.5)
WBC # FLD AUTO: 9.34 K/UL — SIGNIFICANT CHANGE UP (ref 3.8–10.5)

## 2022-01-12 PROCEDURE — 27792 TREATMENT OF ANKLE FRACTURE: CPT | Mod: LT

## 2022-01-12 PROCEDURE — 93005 ELECTROCARDIOGRAM TRACING: CPT

## 2022-01-12 PROCEDURE — 93010 ELECTROCARDIOGRAM REPORT: CPT

## 2022-01-12 PROCEDURE — 99204 OFFICE O/P NEW MOD 45 MIN: CPT

## 2022-01-12 PROCEDURE — 36415 COLL VENOUS BLD VENIPUNCTURE: CPT

## 2022-01-12 PROCEDURE — 85610 PROTHROMBIN TIME: CPT

## 2022-01-12 PROCEDURE — 85730 THROMBOPLASTIN TIME PARTIAL: CPT

## 2022-01-12 PROCEDURE — 85027 COMPLETE CBC AUTOMATED: CPT

## 2022-01-12 PROCEDURE — C1889: CPT

## 2022-01-12 PROCEDURE — C1713: CPT

## 2022-01-12 PROCEDURE — 80048 BASIC METABOLIC PNL TOTAL CA: CPT

## 2022-01-12 PROCEDURE — 76000 FLUOROSCOPY <1 HR PHYS/QHP: CPT

## 2022-01-12 PROCEDURE — 77071 MNL APPL STRS JT RADIOGRAPHY: CPT | Mod: LT

## 2022-01-12 PROCEDURE — 27829 TREAT LOWER LEG JOINT: CPT | Mod: LT

## 2022-01-12 RX ORDER — CELECOXIB 200 MG/1
200 CAPSULE ORAL ONCE
Refills: 0 | Status: COMPLETED | OUTPATIENT
Start: 2022-01-12 | End: 2022-01-12

## 2022-01-12 RX ORDER — ENOXAPARIN SODIUM 100 MG/ML
40 INJECTION SUBCUTANEOUS
Qty: 14 | Refills: 1
Start: 2022-01-12 | End: 2022-02-08

## 2022-01-12 RX ORDER — MELOXICAM 15 MG/1
1 TABLET ORAL
Qty: 0 | Refills: 0 | DISCHARGE

## 2022-01-12 RX ORDER — ONDANSETRON 8 MG/1
1 TABLET, FILM COATED ORAL
Qty: 10 | Refills: 0
Start: 2022-01-12

## 2022-01-12 RX ORDER — ENOXAPARIN SODIUM 100 MG/ML
40 INJECTION SUBCUTANEOUS DAILY
Refills: 0 | Status: DISCONTINUED | OUTPATIENT
Start: 2022-01-12 | End: 2022-01-12

## 2022-01-12 RX ORDER — ONDANSETRON 8 MG/1
4 TABLET, FILM COATED ORAL ONCE
Refills: 0 | Status: DISCONTINUED | OUTPATIENT
Start: 2022-01-12 | End: 2022-01-12

## 2022-01-12 RX ORDER — OXYCODONE HYDROCHLORIDE 5 MG/1
1 TABLET ORAL
Qty: 20 | Refills: 0
Start: 2022-01-12 | End: 2022-01-16

## 2022-01-12 RX ORDER — HYDROMORPHONE HYDROCHLORIDE 2 MG/ML
0.5 INJECTION INTRAMUSCULAR; INTRAVENOUS; SUBCUTANEOUS
Refills: 0 | Status: DISCONTINUED | OUTPATIENT
Start: 2022-01-12 | End: 2022-01-12

## 2022-01-12 RX ORDER — DOCUSATE SODIUM 100 MG
1 CAPSULE ORAL
Qty: 60 | Refills: 0
Start: 2022-01-12 | End: 2022-01-18

## 2022-01-12 RX ORDER — METFORMIN HYDROCHLORIDE 850 MG/1
1 TABLET ORAL
Qty: 0 | Refills: 0 | DISCHARGE

## 2022-01-12 RX ORDER — ASPIRIN/CALCIUM CARB/MAGNESIUM 324 MG
1 TABLET ORAL
Qty: 0 | Refills: 0 | DISCHARGE

## 2022-01-12 RX ORDER — OXYCODONE HYDROCHLORIDE 5 MG/1
5 TABLET ORAL ONCE
Refills: 0 | Status: DISCONTINUED | OUTPATIENT
Start: 2022-01-12 | End: 2022-01-12

## 2022-01-12 RX ORDER — METFORMIN HYDROCHLORIDE 850 MG/1
500 TABLET ORAL
Qty: 0 | Refills: 0 | DISCHARGE

## 2022-01-12 RX ORDER — CEPHALEXIN 500 MG
1 CAPSULE ORAL
Qty: 21 | Refills: 0
Start: 2022-01-12 | End: 2022-01-18

## 2022-01-12 RX ORDER — FAMOTIDINE 10 MG/ML
20 INJECTION INTRAVENOUS ONCE
Refills: 0 | Status: COMPLETED | OUTPATIENT
Start: 2022-01-12 | End: 2022-01-12

## 2022-01-12 RX ORDER — ACETAMINOPHEN 500 MG
975 TABLET ORAL ONCE
Refills: 0 | Status: COMPLETED | OUTPATIENT
Start: 2022-01-12 | End: 2022-01-12

## 2022-01-12 RX ORDER — SODIUM CHLORIDE 9 MG/ML
1000 INJECTION INTRAMUSCULAR; INTRAVENOUS; SUBCUTANEOUS
Refills: 0 | Status: DISCONTINUED | OUTPATIENT
Start: 2022-01-12 | End: 2022-01-12

## 2022-01-12 RX ORDER — MEPERIDINE HYDROCHLORIDE 50 MG/ML
12.5 INJECTION INTRAMUSCULAR; INTRAVENOUS; SUBCUTANEOUS
Refills: 0 | Status: DISCONTINUED | OUTPATIENT
Start: 2022-01-12 | End: 2022-01-12

## 2022-01-12 RX ORDER — CEPHALEXIN 500 MG
1 CAPSULE ORAL
Qty: 28 | Refills: 0
Start: 2022-01-12 | End: 2022-01-18

## 2022-01-12 RX ORDER — TRAZODONE HCL 50 MG
0 TABLET ORAL
Qty: 0 | Refills: 0 | DISCHARGE

## 2022-01-12 RX ORDER — ROSUVASTATIN CALCIUM 5 MG/1
1 TABLET ORAL
Qty: 0 | Refills: 0 | DISCHARGE

## 2022-01-12 RX ORDER — EMPAGLIFLOZIN 10 MG/1
1 TABLET, FILM COATED ORAL
Qty: 0 | Refills: 0 | DISCHARGE

## 2022-01-12 RX ADMIN — FAMOTIDINE 20 MILLIGRAM(S): 10 INJECTION INTRAVENOUS at 11:09

## 2022-01-12 RX ADMIN — CELECOXIB 200 MILLIGRAM(S): 200 CAPSULE ORAL at 11:09

## 2022-01-12 RX ADMIN — Medication 975 MILLIGRAM(S): at 11:09

## 2022-01-12 NOTE — ASU DISCHARGE PLAN (ADULT/PEDIATRIC) - ASU DC SPECIAL INSTRUCTIONSFT
Post-Operative Instructions    PRESCRIPTIONS: your post-operative medications have been sent to the pharmacy you indicated at your pre-operative visit.  If you have any difficulty obtaining your post-operative medications or have any questions, please call the office at [phone].      PAIN MANAGEMENT: You should expect to have discomfort for the first week or so after surgery. Pain medication should be taken to help alleviate the pain so that you are comfortable and can participate in physical therapy.  Take the medication as directed.  You may decrease the amount of pain medication, as tolerated, when pain improves.  You must exercise caution when operating a motor vehicle. You have been prescribed one or more of the following as indicated on your Med Rec form that the Nurse will go over with you:  [X] Oxycodone 5mg 1-2 tablets by mouth every 4 to 6 hours as needed for pain  Oxycodone is a short-acting pain medication routinely prescribed after surgery.  It is the pain medication found in “Percocet,” which is a combination of oxycodone and Tylenol.  If you were prescribed oxycodone, you may take Tylenol in addition to this medication, if needed for pain control.    NAUSEA: Nausea is a common side effect of anesthesia and pain medications.  You may take the below medication if you are experiencing nausea after surgery.  If you continue to experience nausea or vomiting more than 24 hours after surgery, please call the office.  [X] Ondansetron 4mg by mouth every 4 hours as needed for nausea    CONSTIPATION: common side effect of anesthesia and pain medications.  You should take Colace three times daily, as long as you are taking narcotic pain medications after surgery, such as oxycodone, oxycontin, vicodin, or norco.  [X] Colace 100mg by mouth three times daily    DVT PROPHYLAXIS (PREVENTION OF BLOOD CLOTS): The anticoagulation team will prescribe a medication that will reduce the risk of blood clots after surgery, particularly after surgery on the legs, ankles and feet.  It is essential that you take this medication as instructed.    Antibiotics: You have been prescribed an antibiotic, Keflex, that has been sent to your pharmacy. This will help to prevent postoperative infection. Please take as directed, four times per day for total of seven days.     ACTIVITY: You should be up and moving as much and as often as possible! Do NOT walk or put bodyweight on your splint or surgical side. Use Crutches or walker. You must keep your bandage/splint dry. Do NOT get it wet. IF you shower it MUST be covered tightly with a garbage bag. Otherwise sponge bath is advised. You do NOT want wet bandages on your skin as they can cause skin breakdown under the splint.    BANDAGE: Your bandage will be changed in the office. Do NOT remove it yourself. IF it gets damaged or wet (soaked) call. Follow up about 7-10 days in office or as otherwise advised by Dr. Devi if different.

## 2022-01-12 NOTE — CONSULT NOTE ADULT - ASSESSMENT
Patient is a 61y old  Male who presents with a chief complaint of Left leg pain, h/o Left ankle surgery,  Now s/p Left ankle ORIF. Consulted by     for VTE prophylaxis, risk stratification, and anticoagulation management.Patient is High risk for VTE and low bleeding risk    Plan  :Lovenox 40mg sq daily for Two weeks then switch to ECASA  :daily cbc/bmp  :LE Venodynes  : increase mobility as tolerated  :Thanks for consult will f/u

## 2022-01-12 NOTE — ASU DISCHARGE PLAN (ADULT/PEDIATRIC) - NS MD DC FALL RISK RISK
For information on Fall & Injury Prevention, visit: [email or web address] OR  [email or web address] OR  https://www.cdc.gov/steadi/patient.html

## 2022-01-12 NOTE — ASU DISCHARGE PLAN (ADULT/PEDIATRIC) - CARE PROVIDER_API CALL
Yang Devi (DO)  Orthopaedic Surgery  [address] [place] [address]  Phone: [phone]  Fax: [phone]  Follow Up Time:

## 2022-01-12 NOTE — CONSULT NOTE ADULT - SUBJECTIVE AND OBJECTIVE BOX
HPI:      Patient is a 61y old  Male who presents with a chief complaint of Left leg pain, h/o Left ankle surgery,  Now s/p Left ankle ORIF. Consulted by Dr. Devi   for VTE prophylaxis, risk stratification, and anticoagulation management.    PAST MEDICAL & SURGICAL HISTORY:  Diabetes    Arthritis    Back pain    Bimalleolar fracture  left ankle    History of peripheral neuropathy    Venous insufficiency    H/O sleep apnea    H/O hernia repair    H/O arthroscopy of knee    Interval History  22:Patient seen at bedside Discussed anticoagulation with lovenox and the risks and benefits with patient.Patient denies any h/o VTE or cancer did have h/o varicose vein, patient is amenable for Lovenox self injections, Verbalized understanding and is in agreement with treatment plan.        CrCl:108.6  EBL:25ml    Caprini VTE Risk Score:CAPRINI SCORE  AGE RELATED RISK FACTORS                                                       MOBILITY RELATED FACTORS  [ ] Age 41-60 years                                            (1 Point)                  [ ] Bed rest /restricted mobility                             (1 Point)  [ x] Age: 61-74 years                                           (2 Points)                [ ] Plaster cast                                                   (2 Points)  [ ] Age= 75 years                                              (3 Points)                 [ ] Bed bound for more than 72 hours                   (2 Points)    DISEASE RELATED RISK FACTORS                                               GENDER SPECIFIC FACTORS  [ ] Edema in the lower extremities                       (1 Point)           [ ] Pregnancy                                                            (1 Point)  [x ] Varicose veins                                               (1 Point)                  [ ] Post-partum < 6 weeks                                      (1 Point)             [ ] BMI > 25 Kg/m2                                            (1 Point)                  [ ] Hormonal therapy or oral contraception       (1 Point)                 [ ] Sepsis (in the previous month)                        (1 Point)             [ ] History of pregnancy complications                (1Point)  [ ] Pneumonia or serious lung disease                                             [ ] Unexplained or recurrent  (=/>3), premature                                 (In the previous month)                               (1 Point)                birth with toxemia or growth-restricted infant (1 Point)  [ ] Abnormal pulmonary function test            (1 Point)                                   SURGERY RELATED RISK FACTORS  [ ] Acute myocardial infarction                       (1 Point)                  [ ]  Section                                         (1 Point)  [ ] Congestive heart failure (in the previous month) (1 Point)   [ ] Minor surgery   lasting <45 minutes       (1 Point)   [ ] Inflammatory bowel disease                             (1 Point)          [ ] Arthroscopic surgery                                  (2 Points)  [ ] Central venous access                                    (2 Points)            [ ] General surgery lasting >45 minutes      (2 Points)       [ ] Stroke (in the previous month)                  (5 Points)            [ ] Elective major lower extremity arthroplasty (5 Points)                                   [  ] Malignancy (present or past include skin melanoma                                          but exclude  basal skin cell)    (2 points)                                      TRAUMA RELATED RISK FACTORS                HEMATOLOGY RELATED FACTORS                                  [x ] Fracture of the hip, pelvis, or leg                       (5 Points)  [ ] Prior episodes of VTE                                     (3 Points)          [ ] Acute spinal cord injury (in the previous month)  (5 Points)  [ ] Positive family history for VTE                         (3 Points)       [ ] Paralysis (less than 1 month)                          (5 Points)  [ ] Prothrombin 00260 A                                      (3 Points)         [ ] Multiple Trauma (within 1month)                 (5Points)                                                                                                                                                                [ ] Factor V Leiden                                          (3 Points)                                OTHER RISK FACTORS                          [ ] Lupus anticoagulants                                     (3 Points)                       [ ] BMI > 40                          (1 Point)                                                         [ ] Anticardiolipin antibodies                                (3 Points)                   [ ] Smoking                              (1Point)                                                [ ] High homocysteine in the blood                      (3 Points)                [  ] Diabetes requiring insulin (1point)                         [ ] Other congenital or acquired thrombophilia       (3 Points)          [  ] Chemotherapy                   (1 Point)  [ ] Heparin induced thrombocytopenia                  (3 Points)             [  ] Blood Transfusion                (1 point)                                                                                                             Total Score [   8       ]                                                                                                                                                                                                                                                                                                                                                                                                                                           IMPROVE Bleeding Risk Score:2.5        Time In: 12:35  Time Out: 14:19    Falls Risk:   High (  )  Mod (  )  Low ( X )      FAMILY HISTORY:    Denies any personal or familial history of clotting or bleeding disorders.    Allergies    No Known Allergies    Intolerances        REVIEW OF SYSTEMS    (  )Fever	     (  )Constipation	(  )SOB				(  )Headache	(  )Dysuria  (  )Chills	     (  )Melena	(  )Dyspnea present on exertion	                    (  )Dizziness                    (  )Polyuria  (  )Nausea	     (  )Hematochezia	(  )Cough			                    (  )Syncope   	(  )Hematuria  (  )Vomiting    (  )Chest Pain	(  )Wheezing			(  )Weakness  (  )Diarrhea     (  )Palpitations	(  )Anorexia			( x )joint pain    All  other review of systems negative: Yes    Vital Signs Last 24 Hrs  T(C): 36.8 (2022 10:46), Max: 36.8 (2022 10:46)  T(F): 98.2 (2022 10:46), Max: 98.2 (2022 10:46)  HR: 86 (2022 10:46) (86 - 86)  BP: --  BP(mean): --  RR: 16 (2022 10:46) (16 - 16)  SpO2: 99% (2022 10:46) (99% - 99%)    PHYSICAL EXAM:    Constitutional: Appears Well    Neurological: A& O x 3    Skin: Warm    Respiratory and Thorax: normal effort; Breath sounds: normal; No rales/wheezing/rhonchi  	  Cardiovascular: S1, S2, regular, NMBR	    Gastrointestinal: BS + x 4Q, nontender	    Genitourinary:  Bladder nondistended, nontender    Musculoskeletal:   General Right:   no muscle/joint tenderness,   normal tone, no joint swelling,   ROM: full	    General Left:   + muscle/joint tenderness,   normal tone, no joint swelling,   ROM: limited    lower LEG:   Left: Dressing CDI;  Cap refill good;  Sensation intact    Lower extrems:   Right: no calf tenderness              negative earnest's sign               + pedal pulses    Left:   n/a                        14.7   9.34  )-----------( 370      ( 2022 10:54 )             43.7       01-12    135  |  101  |  17  ----------------------------<  149<H>  3.7   |  27  |  0.87    Ca    9.1      2022 10:54        PT/INR - ( 2022 10:54 )   PT: 13.1 sec;   INR: 1.14 ratio         PTT - ( 2022 10:54 )  PTT:30.6 sec				    MEDICATIONS  (STANDING):  sodium chloride 0.9% lock flush 3 milliLiter(s) IV Push every 8 hours  sodium chloride 0.9%. 1000 milliLiter(s) IV Continuous <Continuous>          DVT Prophylaxis:  LMWH                   (X  )  Heparin SQ           (  )  Coumadin             (  )  Xarelto                  (  )  Eliquis                   (  )  Venodynes           ( X )  Ambulation          ( X )  UFH                       (  )  Contraindicated  (  )  EC ASPIRIN       (  )

## 2022-01-12 NOTE — BRIEF OPERATIVE NOTE - NSICDXBRIEFPROCEDURE_GEN_ALL_CORE_FT
PROCEDURES:  ORIF, fracture, ankle, bimalleolar 12-Jan-2022 14:28:06 Left with syndesmotic repair Gautam Grimm

## 2022-01-14 RX ORDER — OXYCODONE AND ACETAMINOPHEN 5; 325 MG/1; MG/1
5-325 TABLET ORAL
Qty: 40 | Refills: 0 | Status: ACTIVE | COMMUNITY
Start: 2022-01-14 | End: 1900-01-01

## 2022-01-19 ENCOUNTER — APPOINTMENT (OUTPATIENT)
Dept: ORTHOPEDIC SURGERY | Facility: CLINIC | Age: 62
End: 2022-01-19
Payer: OTHER MISCELLANEOUS

## 2022-01-19 DIAGNOSIS — Z79.84 LONG TERM (CURRENT) USE OF ORAL HYPOGLYCEMIC DRUGS: ICD-10-CM

## 2022-01-19 DIAGNOSIS — E11.9 TYPE 2 DIABETES MELLITUS WITHOUT COMPLICATIONS: ICD-10-CM
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
## 2022-01-19 DIAGNOSIS — E11.65 TYPE 2 DIABETES MELLITUS WITH HYPERGLYCEMIA: ICD-10-CM

## 2022-01-19 DIAGNOSIS — E78.5 HYPERLIPIDEMIA, UNSPECIFIED: ICD-10-CM

## 2022-01-19 DIAGNOSIS — S82.62XA DISPLACED FRACTURE OF LATERAL MALLEOLUS OF LEFT FIBULA, INITIAL ENCOUNTER FOR CLOSED FRACTURE: ICD-10-CM
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
## 2022-01-19 PROBLEM — I87.2 VENOUS INSUFFICIENCY (CHRONIC) (PERIPHERAL): Chronic | Status: ACTIVE | Noted: 2022-01-11

## 2022-01-19 PROBLEM — Z86.69 PERSONAL HISTORY OF OTHER DISEASES OF THE NERVOUS SYSTEM AND SENSE ORGANS: Chronic | Status: ACTIVE | Noted: 2022-01-12

## 2022-01-19 PROBLEM — S82.843A DISPLACED BIMALLEOLAR FRACTURE OF UNSPECIFIED LOWER LEG, INITIAL ENCOUNTER FOR CLOSED FRACTURE: Chronic | Status: ACTIVE | Noted: 2022-01-11

## 2022-01-19 PROBLEM — Z86.69 PERSONAL HISTORY OF OTHER DISEASES OF THE NERVOUS SYSTEM AND SENSE ORGANS: Chronic | Status: ACTIVE | Noted: 2022-01-11

## 2022-01-19 PROCEDURE — 97760 ORTHOTIC MGMT&TRAING 1ST ENC: CPT

## 2022-01-19 PROCEDURE — 99024 POSTOP FOLLOW-UP VISIT: CPT

## 2022-01-19 RX ORDER — OXYCODONE AND ACETAMINOPHEN 5; 325 MG/1; MG/1
5-325 TABLET ORAL
Qty: 30 | Refills: 0 | Status: ACTIVE | COMMUNITY
Start: 2022-01-19 | End: 1900-01-01

## 2022-01-19 RX ORDER — IBUPROFEN 800 MG/1
800 TABLET, FILM COATED ORAL
Qty: 40 | Refills: 0 | Status: ACTIVE | COMMUNITY
Start: 2022-01-19 | End: 1900-01-01

## 2022-01-19 NOTE — ADDENDUM
[FreeTextEntry1] : I, Emely Boykin, acted solely as a scribe for Dr. Yang Devi on this date 01/19/2022.\par \par All medical record entries made by the Scribe were at my, Dr. Yang Devi, direction and personally dictated by me on 01/19/2022 . I have reviewed the chart and agree that the record accurately reflects my personal performance of the history, physical exam, assessment and plan. I have also personally directed, reviewed, and agreed with the chart.	\par

## 2022-01-19 NOTE — HISTORY OF PRESENT ILLNESS
[___ Days Post Op] : post op day #[unfilled] [Swelling] : swollen [Chills] : no chills [Fever] : no fever [Nausea] : no nausea [Vomiting] : no vomiting [Healed] : not healed [Erythema] : not erythematous [Discharge] : absent of discharge [Dehiscence] : not dehisced [Sutures Removed] : sutures were not removed [Steri-Strips Removed & Replaced] : steri-strips not removed [de-identified] : s/p ORIF of the left ankle\par DOS: 1/12/2022 [de-identified] : 61 year old  male presenting in the office 7 days post op from ORIF of the left ankle. The patient's pain is noted to be 6/10. He is currently utilizing pain medications PRN. He is currently utilizing Aspirin for DVT Prophylaxis. He is also currently on antibiotics. He presents nonweightbearing, wearing a protective splint. The patient presents ambulating with crutches. No other complaints at this time.		 [de-identified] : General: Alert and oriented x3. In no acute distress. Pleasant in nature with a normal affect. No apparent respiratory distress.\par The wound is intact. no evidence of any diastases or dehiscence. No surrounding erythema noted. No fluctuance. The area is warm and well perfused. The patient is able to wiggle their toes. Neurovascularly intact.		\par \par Sutures were clean, dry, and intact. There is a healing blood blister on the medial side of the ankle. Sutures were not removed.  [de-identified] : No new imaging obtained.  [de-identified] : 61 year old  male presenting in the office 7 days post op from ORIF of the left ankle [de-identified] : 61 year old  male presenting in the office 7 days post op from ORIF of the left ankle.\par \par I recommended that the patient utilize a CAM boot. The patient was fitted for the CAM boot in the office today. The patient was educated about the boot wear pattern and utilization, as well as the timeframe to come out of the boot. He was also given full instructions for using the boot. I advised the patient to utilize 325 mg of Aspirin as instructed for blood thinning purposes. He should remain nonweightbearing in the CAM boot. \par \par I recommend that the patient utilize ice, NSAIDs, and heat PRN. They can also elevate their left ankle above the level of the heart.\par \par Further, wound care education was reviewed with the patient. We discussed that the patient should complete the course of antibiotics until completion. 		. \par \par I have addressed all the patient's concerns surrounding the pathology of their condition. The patient understood and verbally agreed to the treatment plan. All of their questions were answered and they were satisfied with the visit. The patient should call the office if they have any questions or experience worsening symptoms.\par \par Follow up in 1 week for suture removal and for further evaluation.

## 2022-01-26 ENCOUNTER — APPOINTMENT (OUTPATIENT)
Dept: ORTHOPEDIC SURGERY | Facility: CLINIC | Age: 62
End: 2022-01-26
Payer: OTHER MISCELLANEOUS

## 2022-01-26 PROCEDURE — 99024 POSTOP FOLLOW-UP VISIT: CPT

## 2022-01-26 RX ORDER — IBUPROFEN 800 MG/1
800 TABLET, FILM COATED ORAL
Qty: 40 | Refills: 0 | Status: ACTIVE | COMMUNITY
Start: 2022-01-26 | End: 1900-01-01

## 2022-01-26 RX ORDER — OXYCODONE 5 MG/1
5 TABLET ORAL
Qty: 40 | Refills: 0 | Status: ACTIVE | COMMUNITY
Start: 2022-01-26 | End: 1900-01-01

## 2022-01-26 NOTE — HISTORY OF PRESENT ILLNESS
[Swelling] : swollen [___ Weeks Post Op] : [unfilled] weeks post op [Healed] : healed [Doing Well] : is doing well [Excellent Pain Control] : has excellent pain control [No Sign of Infection] : is showing no signs of infection [Sutures Removed] : sutures were removed [Steri-Strips Removed & Replaced] : steri-strips removed and replaced [Chills] : no chills [Fever] : no fever [Nausea] : no nausea [Vomiting] : no vomiting [Erythema] : not erythematous [Discharge] : absent of discharge [Dehiscence] : not dehisced [de-identified] : s/p ORIF of the left ankle\par DOS: 1/12/2022 [de-identified] : 61 year old  male presenting in the office 14 days post op from ORIF of the left ankle. The patient's pain is noted to be 7/10. He is currently utilizing pain medications PRN. He was using Lovenox, last injection was today.  He is also currently on antibiotics. He presents nonweightbearing, wearing a protective boot. The patient presents ambulating with crutches. No other complaints at this time.		 [de-identified] : General: Alert and oriented x3. In no acute distress. Pleasant in nature with a normal affect. No apparent respiratory distress.\par The wound is intact. no evidence of any diastases or dehiscence. No surrounding erythema noted. No fluctuance. The area is warm and well perfused. The patient is able to wiggle their toes. Neurovascularly intact.		\par \par Sutures were clean, dry, and intact. There is a healing blood blister on the medial side of the ankle. Sutures were  removed and we applied soft tissue dressings in the office today.   [de-identified] : No new imaging obtained.  [de-identified] : 61 year old  male presenting in the office 2 weeks post op from ORIF of the left ankle [de-identified] : 61 year old  male presenting in the office 2 weeks post op from ORIF of the left ankle.\par \par I recommended that the patient continue to utilize a CAM boot. The patient was educated about the boot wear pattern and utilization, as well as the timeframe to come out of the boot. He was also given full instructions for using the boot. I advised the patient to utilize 325 mg of Aspirin as instructed for DVT Prophylaxis. He may begin gentle ROM of the ankle. He should remain nonweightbearing until further evaluation. \par \par I recommend that the patient utilize ice, NSAIDs, and heat PRN. They can also elevate their left ankle above the level of the heart.		\par \par The patient will be moving to [place] as of 3/1/2022. \par \par Pain medications were renewed in the office today. Further, wound care was reviewed with the patient in the office today. \par \par I have addressed all the patient's concerns surrounding the pathology of their condition. The patient understood and verbally agreed to the treatment plan. All of their questions were answered and they were satisfied with the visit. The patient should call the office if they have any questions or experience worsening symptoms.\par \par Follow up in 2-3 weeks for re-evaluation and for new x-rays.

## 2022-01-26 NOTE — ADDENDUM
[FreeTextEntry1] : I, Emely Boykin, acted solely as a scribe for Dr. Yang Devi on this date 01/26/2022.\par \par All medical record entries made by the Scribe were at my, Dr. Yang Devi, direction and personally dictated by me on 01/26/2022 . I have reviewed the chart and agree that the record accurately reflects my personal performance of the history, physical exam, assessment and plan. I have also personally directed, reviewed, and agreed with the chart.	\par

## 2022-02-09 ENCOUNTER — APPOINTMENT (OUTPATIENT)
Dept: ORTHOPEDIC SURGERY | Facility: CLINIC | Age: 62
End: 2022-02-09
Payer: OTHER MISCELLANEOUS

## 2022-02-09 PROCEDURE — 73610 X-RAY EXAM OF ANKLE: CPT | Mod: LT

## 2022-02-09 PROCEDURE — 99024 POSTOP FOLLOW-UP VISIT: CPT

## 2022-02-09 RX ORDER — IBUPROFEN 800 MG/1
800 TABLET, FILM COATED ORAL
Qty: 40 | Refills: 0 | Status: ACTIVE | COMMUNITY
Start: 2022-02-09 | End: 1900-01-01

## 2022-02-09 NOTE — HISTORY OF PRESENT ILLNESS
[___ Weeks Post Op] : [unfilled] weeks post op [Healed] : healed [Swelling] : swollen [Doing Well] : is doing well [Excellent Pain Control] : has excellent pain control [No Sign of Infection] : is showing no signs of infection [Sutures Removed] : sutures were removed [Steri-Strips Removed & Replaced] : steri-strips removed and replaced [Chills] : no chills [Fever] : no fever [Nausea] : no nausea [Vomiting] : no vomiting [Erythema] : not erythematous [Discharge] : absent of discharge [Dehiscence] : not dehisced [de-identified] : s/p ORIF of the left ankle\par DOS: 1/12/2022 [de-identified] : 61 year old  male presenting post op from ORIF of the left ankle. The patient's pain is noted to be 7/10. He is currently utilizing pain medications PRN. He was using DVT prophylaxis. . He presents nonweightbearing, wearing a protective boot. The patient presents ambulating with crutches. No other complaints at this time.\par \par As per the patient, before he broke his ankle, his right knee locked on him causing him to fall, causing the ankle fracture which led to the ankle surgery.		 [de-identified] : General: Alert and oriented x3. In no acute distress. Pleasant in nature with a normal affect. No apparent respiratory distress.\par The wound is intact. no evidence of any diastases or dehiscence. No surrounding erythema noted. No fluctuance. The area is warm and well perfused. The patient is able to wiggle their toes. Neurovascularly intact.		\par \par Ankle range of motion:\par Dorsiflexion to neutral\par Plantarflexion to 30 degrees\par Subtalar motion 5 degrees [de-identified] : 3 views x-rays left ankle reviewed, 2/9/2022: Hardware intact.  Fracture stable and healing.  [de-identified] : 61 year old  male presenting in the office 4 weeks post op from ORIF of the left ankle [de-identified] : 61 year old  male presenting in the office post op from ORIF of the left ankle.\par \par The patient can now transition from the crutches to weightbearing as tolerated with the boot on starting now.  I want him to start outpatient physical therapy for gait training, balance, range of motion of the ankle, and strength.  In 2 to 3 weeks he can transition from the boot into an ASO brace and fully weight-bear as tolerated.  He will continue with DVT prophylaxis until he is walking without assistance normally.  We will see him back here in 4 to 6 weeks.  All of his questions were answered.

## 2022-02-23 RX ORDER — OXYCODONE 5 MG/1
5 TABLET ORAL
Qty: 15 | Refills: 0 | Status: ACTIVE | COMMUNITY
Start: 2022-02-09 | End: 1900-01-01

## 2022-02-24 ENCOUNTER — APPOINTMENT (OUTPATIENT)
Dept: ORTHOPEDIC SURGERY | Facility: CLINIC | Age: 62
End: 2022-02-24
Payer: OTHER MISCELLANEOUS

## 2022-02-24 PROCEDURE — 73610 X-RAY EXAM OF ANKLE: CPT | Mod: 26,RT

## 2022-02-24 PROCEDURE — 99024 POSTOP FOLLOW-UP VISIT: CPT

## 2022-02-24 RX ORDER — CEPHALEXIN 500 MG/1
500 TABLET ORAL 3 TIMES DAILY
Qty: 21 | Refills: 0 | Status: ACTIVE | COMMUNITY
Start: 2022-02-24 | End: 1900-01-01

## 2022-02-24 NOTE — ADDENDUM
[FreeTextEntry1] : I, Emely Ashutosh, acted solely as a scribe for David Armando PA-C on this date 02/24/2022.\par \par All medical record entries made by the Scribe were at my, David Armando PA-C, direction and personally dictated by me on 02/24/2022  . I have reviewed the chart and agree that the record accurately reflects my personal performance of the history, physical exam, assessment and plan. I have also personally directed, reviewed, and agreed with the chart.	\par

## 2022-02-24 NOTE — HISTORY OF PRESENT ILLNESS
[___ Weeks Post Op] : [unfilled] weeks post op [Healed] : healed [Doing Well] : is doing well [Excellent Pain Control] : has excellent pain control [No Sign of Infection] : is showing no signs of infection [Sutures Removed] : sutures were removed [Steri-Strips Removed & Replaced] : steri-strips removed and replaced [Swelling] : swollen [Chills] : no chills [Fever] : no fever [Nausea] : no nausea [Vomiting] : no vomiting [Erythema] : not erythematous [Discharge] : absent of discharge [Dehiscence] : not dehisced [de-identified] : s/p ORIF of the left ankle\par DOS: 1/12/2022 [de-identified] : 61 year old  male presenting post op from ORIF of the left ankle. The patient reports to the office today for a wound check. Patient reports his incision has been oozing serosanguineous fluid. Patient denies any fevers or chills. He reports some hypersensitivity over the incision site. He has been using a small gauze over the wound while in the CAM boot. Patient reports today is his first day ambulating without crutches. He is WB in the cam boot. He continues to take his ASA. Patient has not started PT as he reports this case is being moved to worker's compensation and he is awaiting approval. He has been doing light ROM at home. He reports pain that is worse at night, requiring narcotics pain medication to help him sleep. Patient states he is moving out of [place] 10 days. [de-identified] : General: Alert and oriented x3. In no acute distress. Pleasant in nature with a normal affect. No apparent respiratory distress.\par There is slight erythema around the wound with no signs of infection. There is also slight dehiscence noted.  No evidence of any diastases.. No fluctuance. The area is warm and well perfused. The patient is able to wiggle their toes. Neurovascularly intact.		\par \par Ankle range of motion: 0-40 degrees\par Subtalar motion 10 degrees [de-identified] : 3 views x-rays left ankle reviewed, 2/24/2022: Hardware intact.  Fracture stable and healing.  [de-identified] : 61 year old  male presenting in the office 6 weeks post op from ORIF of the left ankle [de-identified] : 61 year old male presenting in the office 6 weeks post op from ORIF of the left ankle. \par \par XR imaging was completed in office today and results were reviewed with the patient. \par \par For the patient's wound, I recommend that the patient take Keflex antibiotics. A prescription was provided in the office today. Further I recommended that the patient utilize Betadine solution for the next 5 days. \par \par I recommended that the patient begin to transition out of the CAM boot to an ASO brace. He will continue with PT for ankle ROM and strengthening for the left ankle. He may WBAT. We discussed that the patient may discontinue with the Aspirin for DVT Prophylaxis given his advancement in activities.\par \par The patient will be following to [place] on 3/4/2022. He will follow-up with the office in 2 weeks for re-evaluation prior to his scheduled move. \par \par I have addressed all the patient's concerns surrounding the pathology of their condition. The patient understood and verbally agreed to the treatment plan. All of their questions were answered and they were satisfied with the visit. The patient should call the office if they have any questions or experience worsening symptoms.

## 2022-02-28 RX ORDER — OXYCODONE 5 MG/1
5 TABLET ORAL EVERY 6 HOURS
Qty: 25 | Refills: 0 | Status: ACTIVE | COMMUNITY
Start: 2022-02-28 | End: 1900-01-01

## 2022-03-03 ENCOUNTER — APPOINTMENT (OUTPATIENT)
Dept: ORTHOPEDIC SURGERY | Facility: CLINIC | Age: 62
End: 2022-03-03
Payer: OTHER MISCELLANEOUS

## 2022-03-03 DIAGNOSIS — S91.002A UNSPECIFIED OPEN WOUND, LEFT ANKLE, INITIAL ENCOUNTER: ICD-10-CM

## 2022-03-03 DIAGNOSIS — S82.842A DISPLACED BIMALLEOLAR FRACTURE OF LEFT LOWER LEG, INITIAL ENCOUNTER FOR CLOSED FRACTURE: ICD-10-CM

## 2022-03-03 PROCEDURE — 99024 POSTOP FOLLOW-UP VISIT: CPT

## 2022-03-03 PROCEDURE — 73610 X-RAY EXAM OF ANKLE: CPT | Mod: LT

## 2022-03-03 RX ORDER — OXYCODONE 5 MG/1
5 TABLET ORAL
Qty: 28 | Refills: 0 | Status: ACTIVE | COMMUNITY
Start: 2022-03-03 | End: 1900-01-01

## 2022-03-03 RX ORDER — ASPIRIN 325 MG/1
325 TABLET, FILM COATED ORAL DAILY
Qty: 30 | Refills: 0 | Status: ACTIVE | COMMUNITY
Start: 2022-01-10 | End: 1900-01-01

## 2022-03-03 RX ORDER — CEPHALEXIN 500 MG/1
500 TABLET ORAL 3 TIMES DAILY
Qty: 30 | Refills: 0 | Status: ACTIVE | COMMUNITY
Start: 2022-03-03 | End: 1900-01-01

## 2022-03-03 RX ORDER — MELOXICAM 15 MG/1
15 TABLET ORAL
Qty: 14 | Refills: 2 | Status: ACTIVE | COMMUNITY
Start: 2022-03-03 | End: 1900-01-01

## 2022-03-03 NOTE — HISTORY OF PRESENT ILLNESS
[___ Weeks Post Op] : [unfilled] weeks post op [Healed] : healed [Doing Well] : is doing well [Excellent Pain Control] : has excellent pain control [No Sign of Infection] : is showing no signs of infection [Sutures Removed] : sutures were removed [Steri-Strips Removed & Replaced] : steri-strips removed and replaced [Chills] : no chills [Fever] : no fever [Nausea] : no nausea [Vomiting] : no vomiting [Erythema] : not erythematous [Dehiscence] : not dehisced [de-identified] : s/p ORIF of the left ankle\par DOS: 1/12/2022 [de-identified] : General: Alert and oriented x3. In no acute distress. Pleasant in nature with a normal affect. No apparent respiratory distress.\par There is slight erythema around the wound with no signs of infection. There is also slight white draining from the wound upon milking.  No evidence of any diastases. The area is warm and well perfused. The patient is able to wiggle their toes. Neurovascularly intact.		\par \par Ankle range of motion: 0-40 degrees\par Subtalar motion 10 degrees [de-identified] : 61 year old  male presenting post op from ORIF of the left ankle. The patient reports to the office today for a wound check. Patient reports his incision has been oozing white serosanguineous fluid. Patient denies any fevers or chills. He reports some hypersensitivity over the incision site. He is WB in slippers.. He is not currently onj ASA for DVT Prophylaxis. Patient has not started PT as he reports this case is being moved to worker's compensation and he is awaiting approval. He has been doing light ROM at home. He reports pain that is worse at night, and he currently does take pain medications when pain is severe. He denies any history of renal complications. He is currently on antibiotics. Patient states he is moving out of state in 2 days. [de-identified] : 3 views x-rays left ankle reviewed, 3/03/2022: Hardware intact.  Fracture stable and healing.  [de-identified] : 61 year old  male presenting in the office 7 weeks post op from ORIF of the left ankle [de-identified] : 61 year old  male presenting in the office 7 weeks post op from ORIF of the left ankle.\par \par XR imaging was completed in office today and results were reviewed with the patient. The wound was cleansed with Betadine and was redressed with sterile bandages. At this time, I recommend that the patient take Keflex antibiotics. A prescription was provided in the office today.		\par \par Further, I recommend that the patient utilize meloxicam with food once per day as instructed. A prescription for the meloxicam was ordered for the patient in the office today.		\par \par I advised the patient to utilize 325 mg of Aspirin as instructed for blood thinning purposes given that the patient will be driving out of state in 2 days. The prescription for the Aspirin was provided for the patient in the office today.		\par \par Discussed possible surgical washout if there is worsening of the patient's wound.\par  \par Pain medications were renewed in the office today. \par I have addressed all the patient's concerns surrounding the pathology of their condition. The patient understood and verbally agreed to the treatment plan. All of their questions were answered and they were satisfied with the visit. The patient should call the office if they have any questions or experience worsening symptoms.\par \par Follow up in 1 week for re-evaluation.

## 2022-03-03 NOTE — ADDENDUM
[FreeTextEntry1] : I, Emely Boykin, acted solely as a scribe for Dr. Yang Devi on this date 03/03/2022.\par \par All medical record entries made by the Scribe were at my, Dr. Yang Devi, direction and personally dictated by me on 03/03/2022 . I have reviewed the chart and agree that the record accurately reflects my personal performance of the history, physical exam, assessment and plan. I have also personally directed, reviewed, and agreed with the chart.	\par

## 2022-04-19 RX ORDER — METFORMIN HYDROCHLORIDE 1000 MG/1
1000 TABLET, COATED ORAL
Qty: 2 | Refills: 3 | Status: ACTIVE | COMMUNITY
Start: 2021-05-19 | End: 1900-01-01

## 2022-07-14 RX ORDER — TRAZODONE HYDROCHLORIDE 100 MG/1
100 TABLET ORAL
Qty: 90 | Refills: 0 | Status: ACTIVE | COMMUNITY
Start: 2021-06-10 | End: 1900-01-01

## 2022-10-20 ENCOUNTER — RX RENEWAL (OUTPATIENT)
Age: 62
End: 2022-10-20
